# Patient Record
Sex: MALE | Race: WHITE | NOT HISPANIC OR LATINO | Employment: FULL TIME | URBAN - METROPOLITAN AREA
[De-identification: names, ages, dates, MRNs, and addresses within clinical notes are randomized per-mention and may not be internally consistent; named-entity substitution may affect disease eponyms.]

---

## 2019-03-28 ENCOUNTER — OFFICE VISIT (OUTPATIENT)
Dept: MEDICAL GROUP | Facility: PHYSICIAN GROUP | Age: 54
End: 2019-03-28
Payer: COMMERCIAL

## 2019-03-28 VITALS
HEIGHT: 70 IN | WEIGHT: 241 LBS | OXYGEN SATURATION: 97 % | DIASTOLIC BLOOD PRESSURE: 70 MMHG | HEART RATE: 77 BPM | SYSTOLIC BLOOD PRESSURE: 110 MMHG | TEMPERATURE: 98.4 F | BODY MASS INDEX: 34.5 KG/M2

## 2019-03-28 DIAGNOSIS — E66.9 OBESITY (BMI 30.0-34.9): ICD-10-CM

## 2019-03-28 DIAGNOSIS — G47.33 OSA ON CPAP: ICD-10-CM

## 2019-03-28 DIAGNOSIS — Q23.1 BICUSPID AORTIC VALVE: ICD-10-CM

## 2019-03-28 DIAGNOSIS — H61.21 HEARING LOSS OF RIGHT EAR DUE TO CERUMEN IMPACTION: ICD-10-CM

## 2019-03-28 DIAGNOSIS — M1A.09X0 IDIOPATHIC CHRONIC GOUT OF MULTIPLE SITES WITHOUT TOPHUS: ICD-10-CM

## 2019-03-28 DIAGNOSIS — Z23 NEED FOR VACCINATION: ICD-10-CM

## 2019-03-28 PROBLEM — Q23.81 BICUSPID AORTIC VALVE: Status: ACTIVE | Noted: 2019-03-28

## 2019-03-28 PROBLEM — E66.811 OBESITY (BMI 30.0-34.9): Status: ACTIVE | Noted: 2019-03-28

## 2019-03-28 PROBLEM — M10.9 GOUT: Status: ACTIVE | Noted: 2019-03-28

## 2019-03-28 PROCEDURE — 90686 IIV4 VACC NO PRSV 0.5 ML IM: CPT | Performed by: FAMILY MEDICINE

## 2019-03-28 PROCEDURE — 99204 OFFICE O/P NEW MOD 45 MIN: CPT | Mod: 25 | Performed by: FAMILY MEDICINE

## 2019-03-28 PROCEDURE — 90471 IMMUNIZATION ADMIN: CPT | Performed by: FAMILY MEDICINE

## 2019-03-28 PROCEDURE — 69210 REMOVE IMPACTED EAR WAX UNI: CPT | Performed by: FAMILY MEDICINE

## 2019-03-28 RX ORDER — AMLODIPINE BESYLATE 5 MG/1
5 TABLET ORAL DAILY
COMMUNITY
End: 2019-03-28 | Stop reason: SDUPTHER

## 2019-03-28 RX ORDER — AMLODIPINE BESYLATE 5 MG/1
5 TABLET ORAL DAILY
Qty: 90 TAB | Refills: 3 | Status: SHIPPED | OUTPATIENT
Start: 2019-03-28 | End: 2020-03-23

## 2019-03-28 RX ORDER — M-VIT,TX,IRON,MINS/CALC/FOLIC 27MG-0.4MG
1 TABLET ORAL DAILY
COMMUNITY

## 2019-03-28 ASSESSMENT — PATIENT HEALTH QUESTIONNAIRE - PHQ9: CLINICAL INTERPRETATION OF PHQ2 SCORE: 0

## 2019-03-28 ASSESSMENT — PAIN SCALES - GENERAL: PAINLEVEL: NO PAIN

## 2019-03-28 NOTE — LETTER
[facility]  Tae Thomas M.D.  [address]  [facility] [address]  Fax: [phone]   Authorization for Release/Disclosure of   Protected Health Information   Name: YAKELIN HWANG : 1965 SSN: [id number]   Address: [facility] [address] [facility] [address] Phone:    [phone] (home)    I authorize the entity listed below to release/disclose the PHI below to:   [facility]/Tae Thomas M.D. and Tae Thomas M.D.   Provider or Entity Name:     Address   City, State, Zip   Phone:      Fax:     Reason for request: continuity of care   Information to be released:    [  ] LAST COLONOSCOPY,  including any PATH REPORT and follow-up  [  ] LAST FIT/COLOGUARD RESULT [  ] LAST DEXA  [  ] LAST MAMMOGRAM  [  ] LAST PAP  [  ] LAST LABS [  ] RETINA EXAM REPORT  [  ] IMMUNIZATION RECORDS  [  ] Release all info      [  ] Check here and initial the line next to each item to release ALL health information INCLUDING  _____ Care and treatment for drug and / or alcohol abuse  _____ HIV testing, infection status, or AIDS  _____ Genetic Testing    DATES OF SERVICE OR TIME PERIOD TO BE DISCLOSED: _____________  I understand and acknowledge that:  * This Authorization may be revoked at any time by you in writing, except if your health information has already been used or disclosed.  * Your health information that will be used or disclosed as a result of you signing this authorization could be re-disclosed by the recipient. If this occurs, your re-disclosed health information may no longer be protected by State or Federal laws.  * You may refuse to sign this Authorization. Your refusal will not affect your ability to obtain treatment.  * This Authorization becomes effective upon signing and will  on (date) __________.      If no date is indicated, this Authorization will  one (1) year from the signature date.    Name: Yakelin Hwang    Signature:   Date:     3/28/2019       PLEASE FAX REQUESTED RECORDS  BACK TO: [phone]

## 2019-03-29 NOTE — ASSESSMENT & PLAN NOTE
New problem to examiner.  Patient with a history of gout previously controlled on allopurinol however he has been doing well with controlling his diet and no longer taking the allopurinol.  Last gout outbreak has been over a year.  No pain currently.

## 2019-03-29 NOTE — ASSESSMENT & PLAN NOTE
Chronic ongoing medical condition.  Patient actively working on diet, lifestyle, and exercise changes to help lose weight.  States that this has been difficult given sedentary nature family and sedentary nature of his work.

## 2019-03-29 NOTE — ASSESSMENT & PLAN NOTE
New problem to examiner.  Patient with a right-sided cerumen impaction.  Patient has a history of right-sided cerumen impaction and disimpaction in the past.  Hearing is been gradually getting worse over the last several months on the right side.  Denies any tinnitus.

## 2019-03-29 NOTE — ASSESSMENT & PLAN NOTE
New problem to examiner.  Denies any chest pain, shortness of breath, syncope/lightheadedness.  Currently taking amlodipine 5 mg daily prophylactically to prevent elevated blood pressure and preserve existing valve.

## 2019-03-29 NOTE — PROGRESS NOTES
CC:  Diagnoses of Bicuspid aortic valve, Need for vaccination, CLAIRE on CPAP, Idiopathic chronic gout of multiple sites without tophus, Obesity (BMI 30.0-34.9), and Hearing loss of right ear due to cerumen impaction were pertinent to this visit.    HISTORY OF THE PRESENT ILLNESS: Patient is a 53 y.o. male. This pleasant patient is here today to establish new PCP.    Health Maintenance: Completed      Bicuspid aortic valve  New problem to examiner.  Patient with a history of bicuspid aortic valve status post aortic valve replacement.  No problems since surgery.  Denies any chest pain, shortness of breath, syncope/lightheadedness.    Gout  New problem to examiner.  Patient with a history of gout previously controlled on allopurinol however he has been doing well with controlling his diet and no longer taking the allopurinol.  Last gout outbreak has been over a year.  No pain currently.    Hearing loss of right ear due to cerumen impaction  New problem to examiner.  Patient with a right-sided cerumen impaction.  Patient has a history of right-sided cerumen impaction and disimpaction in the past.  Hearing is been gradually getting worse over the last several months on the right side.  Denies any tinnitus.    Obesity (BMI 30.0-34.9)  Chronic ongoing medical condition.  Patient actively working on diet, lifestyle, and exercise changes to help lose weight.  States that this has been difficult given sedentary nature family and sedentary nature of his work.    CLAIRE on CPAP  Chronic ongoing medical condition.  Patient currently using CPAP with 100% compliance at home.  Denies snoring currently with CPAP use.  States that he feels well rested however he has not seen a sleep medicine physician since his arrival to [place] year and a half ago.  States that he is in need of new CPAP supplies.    Allergies: Patient has no known allergies.    Current Outpatient Prescriptions Ordered in [employer]   Medication Sig Dispense Refill   •  "therapeutic multivitamin-minerals (THERAGRAN-M) Tab Take 1 Tab by mouth every day.     • aspirin EC (ECOTRIN) 81 MG Tablet Delayed Response Take 81 mg by mouth every day.     • amLODIPine (NORVASC) 5 MG Tab Take 1 Tab by mouth every day. 90 Tab 3     No current Epic-ordered facility-administered medications on file.        History reviewed. No pertinent past medical history.    Past Surgical History:   Procedure Laterality Date   • APPENDECTOMY     • MYRINGOTOMY     • SHOULDER ARTHROSCOPY Left        Social History   Substance Use Topics   • Smoking status: Never Smoker   • Smokeless tobacco: Never Used   • Alcohol use Yes      Comment: weekly       Social History     Social History Narrative   • No narrative on file       Family History   Problem Relation Age of Onset   • Heart Disease Mother    • Diabetes Mother    • Stroke Mother    • No Known Problems Father    • Cancer Maternal Grandmother         Lung, smoking   • Heart Disease Maternal Grandmother    • Diabetes Maternal Grandmother    • Cancer Maternal Grandfather    • No Known Problems Paternal Grandmother    • Cancer Paternal Grandfather        ROS:   Constitutional: No Fevers, Chills  Eyes: No eye pain  ENT: No sore throat  Resp: No Shortness of breath  CV: No Chest pain  GI: No Nausea/Vomiting  MSK: No weakness  Skin: No rashes  Neuro: No Headaches  Psych: No Suicidal ideations        Exam: Blood pressure 110/70, pulse 77, temperature 36.9 °C (98.4 °F), height 1.778 m (5' 10\"), weight 109.3 kg (241 lb), SpO2 97 %. Body mass index is 34.58 kg/m².    GENERAL: No acute distress  HENT: Atraumatic, normocephalic, external auditory canals clear bilaterally, TMs translucent and pearly gray, nares clear without discharge, posterior pharynx clear without erythema or exudates.  EYES: Extraocular movements intact, pupils equal and reactive to light.  NECK: Supple, FROM  CHEST: No deformities, Equal chest expansion, regular rate and rhythm, 2/6 systolic murmur, no " gallops, or rubs.  RESP: Unlabored, no stridor or audible wheeze.  No wheezes, crackles, nor rhonchi  ABD: Soft, Nontender, Non-Distended.  Normal bowel sounds.  No hepatosplenomegaly  Extremities: No Clubbing, Cyanosis, or Edema.  Skin: Warm/dry, without rashes  Neuro: A/O x 4, CN 2-12 Grossly intact, Motor/sensory grossly intact  Psych: Normal behavior, normal affect    Assessment/Plan:  1. Bicuspid aortic valve  New problem to examiner.  Currently obtaining records from previous provider and from [facility].  Continue amlodipine 5 mg daily.  New prescription sent to [facility] today.    2. Need for vaccination  - Influenza Vaccine Quad Injection >3Y (PF)    3. CLAIRE on CPAP  New problem to examiner.  Referral to sleep medicine as below.  - REFERRAL TO SLEEP STUDIES    4. Idiopathic chronic gout of multiple sites without tophus  New problem to examiner.  Counseled on diet and cherry extract use.  Counseled on follow-up for worsening gout.  Labs as below.  - URIC ACID; Future    5. Obesity (BMI 30.0-34.9)  New problem to examiner.  Labs as below.  Referral to health improvement program as below.  Patient education material provided per  - CBC WITH DIFFERENTIAL; Future  - Comp Metabolic Panel; Future  - Lipid Profile; Future  - REFERRAL TO [facility] HEALTH IMPROVEMENT PROGRAMS (HIP) Services Requested: General-HIP Staff to Evaluate Best Program; Reason for Visit: Overweight/Obesity    6. Hearing loss of right ear due to cerumen impaction  New problem to examiner.  Manual disimpaction with lighted ear curette by MD successful in removing cerumen impaction.    Please note that this dictation was created using voice recognition software. I have made every reasonable attempt to correct obvious errors, but I expect that there are errors of grammar and possibly content that I did not discover before finalizing the note.

## 2019-03-29 NOTE — ASSESSMENT & PLAN NOTE
Chronic ongoing medical condition.  Patient currently using CPAP with 100% compliance at home.  Denies snoring currently with CPAP use.  States that he feels well rested however he has not seen a sleep medicine physician since his arrival to [place] year and a half ago.  States that he is in need of new CPAP supplies.

## 2019-07-11 ENCOUNTER — HOSPITAL ENCOUNTER (OUTPATIENT)
Dept: LAB | Facility: MEDICAL CENTER | Age: 54
End: 2019-07-11
Attending: FAMILY MEDICINE
Payer: COMMERCIAL

## 2019-07-11 DIAGNOSIS — E66.9 OBESITY (BMI 30.0-34.9): ICD-10-CM

## 2019-07-11 DIAGNOSIS — M1A.09X0 IDIOPATHIC CHRONIC GOUT OF MULTIPLE SITES WITHOUT TOPHUS: ICD-10-CM

## 2019-07-11 LAB
ALBUMIN SERPL BCP-MCNC: 4 G/DL (ref 3.2–4.9)
ALBUMIN/GLOB SERPL: 1.2 G/DL
ALP SERPL-CCNC: 50 U/L (ref 30–99)
ALT SERPL-CCNC: 23 U/L (ref 2–50)
ANION GAP SERPL CALC-SCNC: 11 MMOL/L (ref 0–11.9)
AST SERPL-CCNC: 21 U/L (ref 12–45)
BASOPHILS # BLD AUTO: 0.6 % (ref 0–1.8)
BASOPHILS # BLD: 0.06 K/UL (ref 0–0.12)
BILIRUB SERPL-MCNC: 0.7 MG/DL (ref 0.1–1.5)
BUN SERPL-MCNC: 17 MG/DL (ref 8–22)
CALCIUM SERPL-MCNC: 9 MG/DL (ref 8.5–10.5)
CHLORIDE SERPL-SCNC: 101 MMOL/L (ref 96–112)
CHOLEST SERPL-MCNC: 188 MG/DL (ref 100–199)
CO2 SERPL-SCNC: 26 MMOL/L (ref 20–33)
CREAT SERPL-MCNC: 0.86 MG/DL (ref 0.5–1.4)
EOSINOPHIL # BLD AUTO: 0.15 K/UL (ref 0–0.51)
EOSINOPHIL NFR BLD: 1.5 % (ref 0–6.9)
ERYTHROCYTE [DISTWIDTH] IN BLOOD BY AUTOMATED COUNT: 45 FL (ref 35.9–50)
FASTING STATUS PATIENT QL REPORTED: NORMAL
GLOBULIN SER CALC-MCNC: 3.3 G/DL (ref 1.9–3.5)
GLUCOSE SERPL-MCNC: 96 MG/DL (ref 65–99)
HCT VFR BLD AUTO: 43.9 % (ref 42–52)
HDLC SERPL-MCNC: 38 MG/DL
HGB BLD-MCNC: 14.2 G/DL (ref 14–18)
IMM GRANULOCYTES # BLD AUTO: 0.03 K/UL (ref 0–0.11)
IMM GRANULOCYTES NFR BLD AUTO: 0.3 % (ref 0–0.9)
LDLC SERPL CALC-MCNC: 119 MG/DL
LYMPHOCYTES # BLD AUTO: 2.1 K/UL (ref 1–4.8)
LYMPHOCYTES NFR BLD: 21.6 % (ref 22–41)
MCH RBC QN AUTO: 29.8 PG (ref 27–33)
MCHC RBC AUTO-ENTMCNC: 32.3 G/DL (ref 33.7–35.3)
MCV RBC AUTO: 92.2 FL (ref 81.4–97.8)
MONOCYTES # BLD AUTO: 0.66 K/UL (ref 0–0.85)
MONOCYTES NFR BLD AUTO: 6.8 % (ref 0–13.4)
NEUTROPHILS # BLD AUTO: 6.7 K/UL (ref 1.82–7.42)
NEUTROPHILS NFR BLD: 69.2 % (ref 44–72)
NRBC # BLD AUTO: 0 K/UL
NRBC BLD-RTO: 0 /100 WBC
PLATELET # BLD AUTO: 283 K/UL (ref 164–446)
PMV BLD AUTO: 9.4 FL (ref 9–12.9)
POTASSIUM SERPL-SCNC: 3.7 MMOL/L (ref 3.6–5.5)
PROT SERPL-MCNC: 7.3 G/DL (ref 6–8.2)
RBC # BLD AUTO: 4.76 M/UL (ref 4.7–6.1)
SODIUM SERPL-SCNC: 138 MMOL/L (ref 135–145)
TRIGL SERPL-MCNC: 156 MG/DL (ref 0–149)
URATE SERPL-MCNC: 8.6 MG/DL (ref 2.5–8.3)
WBC # BLD AUTO: 9.7 K/UL (ref 4.8–10.8)

## 2019-07-11 PROCEDURE — 80053 COMPREHEN METABOLIC PANEL: CPT

## 2019-07-11 PROCEDURE — 36415 COLL VENOUS BLD VENIPUNCTURE: CPT

## 2019-07-11 PROCEDURE — 85025 COMPLETE CBC W/AUTO DIFF WBC: CPT

## 2019-07-11 PROCEDURE — 84550 ASSAY OF BLOOD/URIC ACID: CPT

## 2019-07-11 PROCEDURE — 80061 LIPID PANEL: CPT

## 2019-07-12 ENCOUNTER — TELEPHONE (OUTPATIENT)
Dept: MEDICAL GROUP | Facility: PHYSICIAN GROUP | Age: 54
End: 2019-07-12

## 2019-07-12 NOTE — TELEPHONE ENCOUNTER
----- Message from Tae Thomas M.D. sent at 7/12/2019  8:46 AM PDT -----  Please advise patient of relatively normal lab results.  His blood chemistry is normal with normal blood sugars and kidney function and liver function.  His uric acid levels are mildly elevated, consistent with his history of gout and I recommend continued dietary adherence to gout friendly diet to prevent further attacks.  His cholesterol is mildly elevated with an elevated triglyceride level.  I do not believe any cholesterol-lowering medication is indicated at this time however I would recommend sticking to a heart healthy diet.  His blood counts and remaining labs are completely normal.

## 2019-10-04 ENCOUNTER — APPOINTMENT (OUTPATIENT)
Dept: MEDICAL GROUP | Facility: PHYSICIAN GROUP | Age: 54
End: 2019-10-04
Payer: COMMERCIAL

## 2020-03-23 RX ORDER — AMLODIPINE BESYLATE 5 MG/1
TABLET ORAL
Qty: 90 TAB | Refills: 0 | Status: SHIPPED | OUTPATIENT
Start: 2020-03-23 | End: 2020-06-22

## 2020-03-23 NOTE — TELEPHONE ENCOUNTER
Requested Prescriptions     Signed Prescriptions Disp Refills   • amLODIPine (NORVASC) 5 MG Tab 90 Tab 0     Sig: TAKE 1 TABLET DAILY     Authorizing Provider: MARGUERITE CABALLERO A.P.R.N.

## 2020-03-23 NOTE — TELEPHONE ENCOUNTER
Received request via: Pharmacy    Was the patient seen in the last year in this department? Yes LAST SEEN 3/28/2019    Does the patient have an active prescription (recently filled or refills available) for medication(s) requested? No

## 2020-07-21 ENCOUNTER — OFFICE VISIT (OUTPATIENT)
Dept: MEDICAL GROUP | Facility: PHYSICIAN GROUP | Age: 55
End: 2020-07-21
Payer: COMMERCIAL

## 2020-07-21 VITALS
TEMPERATURE: 98.6 F | WEIGHT: 234 LBS | RESPIRATION RATE: 14 BRPM | BODY MASS INDEX: 33.5 KG/M2 | OXYGEN SATURATION: 94 % | SYSTOLIC BLOOD PRESSURE: 138 MMHG | DIASTOLIC BLOOD PRESSURE: 62 MMHG | HEIGHT: 70 IN | HEART RATE: 80 BPM

## 2020-07-21 DIAGNOSIS — M1A.09X0 IDIOPATHIC CHRONIC GOUT OF MULTIPLE SITES WITHOUT TOPHUS: ICD-10-CM

## 2020-07-21 DIAGNOSIS — G47.33 OSA ON CPAP: ICD-10-CM

## 2020-07-21 PROCEDURE — 99214 OFFICE O/P EST MOD 30 MIN: CPT | Performed by: FAMILY MEDICINE

## 2020-07-21 RX ORDER — PREDNISONE 50 MG/1
50 TABLET ORAL DAILY
Qty: 5 TAB | Refills: 0 | Status: SHIPPED | OUTPATIENT
Start: 2020-07-21 | End: 2020-08-13 | Stop reason: SDUPTHER

## 2020-07-21 ASSESSMENT — ENCOUNTER SYMPTOMS
CONSTIPATION: 0
VOMITING: 0
ABDOMINAL PAIN: 0
SHORTNESS OF BREATH: 0
MYALGIAS: 0
FLANK PAIN: 0
CHILLS: 0
FEVER: 0
COUGH: 0
DIARRHEA: 0
NAUSEA: 0
FALLS: 0
DIZZINESS: 0

## 2020-07-21 ASSESSMENT — PAIN SCALES - GENERAL: PAINLEVEL: 10=SEVERE PAIN

## 2020-07-21 ASSESSMENT — FIBROSIS 4 INDEX: FIB4 SCORE: 0.88

## 2020-07-21 ASSESSMENT — PATIENT HEALTH QUESTIONNAIRE - PHQ9: CLINICAL INTERPRETATION OF PHQ2 SCORE: 0

## 2020-07-21 NOTE — PROGRESS NOTES
Subjective:      Fuad Hwang is a 55 y.o. male who presents with Leg Pain (Patient thinks its gout. Both legs )            55-year-old male presents with 2 weeks of progressively worsening polyarticular joint pain.  Patient has a history of gout and has been off of prophylactic medication for his gout for years.  Last gout attack is been 8 years ago since he lived in [facility].  Patient states that his left great toe MTP joint began getting hot, red, and swollen after working in the yard and getting relatively dehydrated and drinking copious amounts of water.  Patient states that he has been working on eating well and having a diet that is appropriate for his gout.  1 beer per week.  Patient has been taking Advil which is calm his gout attack however his pain continues and now as of 1 week ago began having right great toe pain at the MTP joint and right knee pain consistent with his previous history of gout.      Review of Systems   Constitutional: Negative for chills and fever.   Respiratory: Negative for cough and shortness of breath.    Cardiovascular: Negative for chest pain.   Gastrointestinal: Negative for abdominal pain, constipation, diarrhea, nausea and vomiting.   Genitourinary: Negative for flank pain.   Musculoskeletal: Positive for joint pain. Negative for falls and myalgias.   Neurological: Negative for dizziness.     I reviewed the following    No past medical history on file.     Past Surgical History:   Procedure Laterality Date   • APPENDECTOMY     • MYRINGOTOMY     • SHOULDER ARTHROSCOPY Left        No Known Allergies    Current Outpatient Medications   Medication Sig Dispense Refill   • predniSONE (DELTASONE) 50 MG Tab Take 1 Tab by mouth every day. 5 Tab 0   • amLODIPine (NORVASC) 5 MG Tab TAKE 1 TABLET DAILY 90 Tab 3   • therapeutic multivitamin-minerals (THERAGRAN-M) Tab Take 1 Tab by mouth every day.     • aspirin EC (ECOTRIN) 81 MG Tablet Delayed Response Take 81 mg by mouth every  day.       No current facility-administered medications for this visit.         Family History   Problem Relation Age of Onset   • Heart Disease Mother    • Diabetes Mother    • Stroke Mother    • No Known Problems Father    • Cancer Maternal Grandmother         Lung, smoking   • Heart Disease Maternal Grandmother    • Diabetes Maternal Grandmother    • Cancer Maternal Grandfather    • No Known Problems Paternal Grandmother    • Cancer Paternal Grandfather        Social History     Socioeconomic History   • Marital status:      Spouse name: Not on file   • Number of children: Not on file   • Years of education: Not on file   • Highest education level: Not on file   Occupational History   • Not on file   Social Needs   • Financial resource strain: Not on file   • Food insecurity     Worry: Not on file     Inability: Not on file   • Transportation needs     Medical: Not on file     Non-medical: Not on file   Tobacco Use   • Smoking status: Never Smoker   • Smokeless tobacco: Never Used   Substance and Sexual Activity   • Alcohol use: Yes     Comment: weekly   • Drug use: No   • Sexual activity: Yes     Partners: Female     Comment: Monogamous , 2boys, manager    Lifestyle   • Physical activity     Days per week: Not on file     Minutes per session: Not on file   • Stress: Not on file   Relationships   • Social connections     Talks on phone: Not on file     Gets together: Not on file     Attends Sikh service: Not on file     Active member of club or organization: Not on file     Attends meetings of clubs or organizations: Not on file     Relationship status: Not on file   • Intimate partner violence     Fear of current or ex partner: Not on file     Emotionally abused: Not on file     Physically abused: Not on file     Forced sexual activity: Not on file   Other Topics Concern   • Not on file   Social History Narrative   • Not on file           Objective:     /62 (BP Location:  "Right arm, Patient Position: Sitting, BP Cuff Size: Adult)   Pulse 80   Temp 37 °C (98.6 °F) (Temporal)   Resp 14   Ht 1.778 m (5' 10\")   Wt 106.1 kg (234 lb)   SpO2 94%   BMI 33.58 kg/m²      Physical Exam  Constitutional:       Appearance: Normal appearance.   HENT:      Head: Atraumatic.      Mouth/Throat:      Mouth: Mucous membranes are moist.      Pharynx: Oropharynx is clear.   Eyes:      Extraocular Movements: Extraocular movements intact.      Pupils: Pupils are equal, round, and reactive to light.   Neck:      Musculoskeletal: Normal range of motion and neck supple.   Cardiovascular:      Pulses: Normal pulses.   Pulmonary:      Effort: Pulmonary effort is normal.      Breath sounds: Normal breath sounds.   Abdominal:      General: Abdomen is flat. There is no distension.   Musculoskeletal: Normal range of motion.        Legs:         Feet:    Skin:     General: Skin is warm and dry.   Neurological:      General: No focal deficit present.      Mental Status: He is alert and oriented to person, place, and time. Mental status is at baseline.      Cranial Nerves: No cranial nerve deficit.   Psychiatric:         Mood and Affect: Mood normal.         Behavior: Behavior normal.                 Assessment/Plan:       1. Idiopathic chronic gout of multiple sites without tophus  New gout attack.  5 days of prednisone as below.  Follow-up if not improving.  Patient elects not to have any allopurinol at this point.  If recurs we will follow-up.  - predniSONE (DELTASONE) 50 MG Tab; Take 1 Tab by mouth every day.  Dispense: 5 Tab; Refill: 0    2. CLAIRE on CPAP  Patient needs new referral to sleep medicine as below.  - REFERRAL TO PULMONARY AND SLEEP MEDICINE Sleep Medicine    Follow-up PRN    Please note that this dictation was created using voice recognition software. I have worked with consultants from the vendor as well as technical experts from [employer] to optimize the interface. I have made every " reasonable attempt to correct obvious errors, but I expect that there are errors of grammar and possibly content that I did not discover before finalizing the note.

## 2020-07-21 NOTE — PATIENT INSTRUCTIONS
Low-Purine Eating Plan  A low-purine eating plan involves making food choices to limit your intake of purine. Purine is a kind of uric acid. Too much uric acid in your blood can cause certain conditions, such as gout and kidney stones. Eating a low-purine diet can help control these conditions.  What are tips for following this plan?  Reading food labels    · Avoid foods with saturated or Trans fat.  · Check the ingredient list of grains-based foods, such as bread and cereal, to make sure that they contain whole grains.  · Check the ingredient list of sauces or soups to make sure they do not contain meat or fish.  · When choosing soft drinks, check the ingredient list to make sure they do not contain high-fructose corn syrup.  Shopping  · Buy plenty of fresh fruits and vegetables.  · Avoid buying canned or fresh fish.  · Buy dairy products labeled as low-fat or nonfat.  · Avoid buying premade or processed foods. These foods are often high in fat, salt (sodium), and added sugar.  Cooking  · Use olive oil instead of butter when cooking. Oils like olive oil, canola oil, and sunflower oil contain healthy fats.  Meal planning  · Learn which foods do or do not affect you. If you find out that a food tends to cause your gout symptoms to flare up, avoid eating that food. You can enjoy foods that do not cause problems. If you have any questions about a food item, talk with your dietitian or health care provider.  · Limit foods high in fat, especially saturated fat. Fat makes it harder for your body to get rid of uric acid.  · Choose foods that are lower in fat and are lean sources of protein.  General guidelines  · Limit alcohol intake to no more than 1 drink a day for nonpregnant women and 2 drinks a day for men. One drink equals 12 oz of beer, 5 oz of wine, or 1½ oz of hard liquor. Alcohol can affect the way your body gets rid of uric acid.  · Drink plenty of water to keep your urine clear or pale yellow. Fluids can help  remove uric acid from your body.  · If directed by your health care provider, take a vitamin C supplement.  · Work with your health care provider and dietitian to develop a plan to achieve or maintain a healthy weight. Losing weight can help reduce uric acid in your blood.  What foods are recommended?  The items listed may not be a complete list. Talk with your dietitian about what dietary choices are best for you.  Foods low in purines  Foods low in purines do not need to be limited. These include:  · All fruits.  · All low-purine vegetables, pickles, and olives.  · Breads, pasta, rice, cornbread, and popcorn. Cake and other baked goods.  · All dairy foods.  · Eggs, nuts, and nut butters.  · Spices and condiments, such as salt, herbs, and vinegar.  · Plant oils, butter, and margarine.  · Water, sugar-free soft drinks, tea, coffee, and cocoa.  · Vegetable-based soups, broths, sauces, and gravies.  Foods moderate in purines  Foods moderate in purines should be limited to the amounts listed.  · ½ cup of asparagus, cauliflower, spinach, mushrooms, or green peas, each day.  · 2/3 cup uncooked oatmeal, each day.  · ¼ cup dry wheat bran or wheat germ, each day.  · 2-3 ounces of meat or poultry, each day.  · 4-6 ounces of shellfish, such as crab, lobster, oysters, or shrimp, each day.  · 1 cup cooked beans, peas, or lentils, each day.  · Soup, broths, or bouillon made from meat or fish. Limit these foods as much as possible.  What foods are not recommended?  The items listed may not be a complete list. Talk with your dietitian about what dietary choices are best for you.  Limit your intake of foods high in purines, including:  · Beer and other alcohol.  · Meat-based gravy or sauce.  · Canned or fresh fish, such as:  ? Anchovies, sardines, herring, and tuna.  ? Mussels and scallops.  ? Codfish, trout, and jayna.  · Barry.  · Organ meats, such as:  ? Liver or kidney.  ? Tripe.  ? Sweetbreads (thymus gland or  pancreas).  · Wild game or goose.  · Yeast or yeast extract supplements.  · Drinks sweetened with high-fructose corn syrup.  Summary  · Eating a low-purine diet can help control conditions caused by too much uric acid in the body, such as gout or kidney stones.  · Choose low-purine foods, limit alcohol, and limit foods high in fat.  · You will learn over time which foods do or do not affect you. If you find out that a food tends to cause your gout symptoms to flare up, avoid eating that food.  This information is not intended to replace advice given to you by your health care provider. Make sure you discuss any questions you have with your health care provider.  Document Released: 04/13/2012 Document Revised: 11/30/2018 Document Reviewed: 01/31/2018  Elsevier Patient Education © 2020 [employer] Inc[employer]

## 2020-08-12 ENCOUNTER — TELEPHONE (OUTPATIENT)
Dept: MEDICAL GROUP | Facility: PHYSICIAN GROUP | Age: 55
End: 2020-08-12

## 2020-08-12 DIAGNOSIS — M1A.09X0 IDIOPATHIC CHRONIC GOUT OF MULTIPLE SITES WITHOUT TOPHUS: ICD-10-CM

## 2020-08-12 NOTE — TELEPHONE ENCOUNTER
1. Caller Name: Fuad Hwang.                        Call Back Number: ph      How would the patient prefer to be contacted with a response: Phone call OK to leave a detailed message    Pt is having another Gout flare up. Last visit you mentioned if he has another flare up you would order Allopurinol as a maintaince medication to take daily Pt also would like another order of Prednisone if possible. Please send Rx to  [facility] pharmacy on [address]

## 2020-08-13 RX ORDER — PREDNISONE 50 MG/1
50 TABLET ORAL DAILY
Qty: 5 TAB | Refills: 0 | Status: SHIPPED | OUTPATIENT
Start: 2020-08-13 | End: 2021-09-08

## 2020-08-13 RX ORDER — ALLOPURINOL 300 MG/1
300 TABLET ORAL DAILY
Qty: 90 TAB | Refills: 4 | Status: SHIPPED | OUTPATIENT
Start: 2020-08-13 | End: 2020-11-02 | Stop reason: SDUPTHER

## 2020-08-13 NOTE — TELEPHONE ENCOUNTER
Please call and notify patient that his prescriptions were sent to his [facility] pharmacy and new laboratory studies have been ordered and need to be completed in 1 month from starting his allopurinol.  Patient may start allopurinol and prednisone at the same time.

## 2020-11-02 DIAGNOSIS — M1A.09X0 IDIOPATHIC CHRONIC GOUT OF MULTIPLE SITES WITHOUT TOPHUS: ICD-10-CM

## 2020-11-03 RX ORDER — ALLOPURINOL 300 MG/1
300 TABLET ORAL DAILY
Qty: 90 TAB | Refills: 4 | Status: SHIPPED | OUTPATIENT
Start: 2020-11-03 | End: 2021-09-08 | Stop reason: SDUPTHER

## 2020-12-18 ENCOUNTER — TELEMEDICINE (OUTPATIENT)
Dept: TELEHEALTH | Facility: TELEMEDICINE | Age: 55
End: 2020-12-18
Payer: COMMERCIAL

## 2020-12-18 DIAGNOSIS — Z20.822 EXPOSURE TO COVID-19 VIRUS: ICD-10-CM

## 2020-12-18 PROCEDURE — 99212 OFFICE O/P EST SF 10 MIN: CPT | Mod: 95,CR,CS | Performed by: NURSE PRACTITIONER

## 2020-12-18 ASSESSMENT — ENCOUNTER SYMPTOMS
NAUSEA: 0
DIZZINESS: 0
SORE THROAT: 0
MYALGIAS: 0
VOMITING: 0
FEVER: 0
CHILLS: 0
EYE REDNESS: 0
SHORTNESS OF BREATH: 0

## 2020-12-19 ENCOUNTER — HOSPITAL ENCOUNTER (OUTPATIENT)
Dept: LAB | Facility: MEDICAL CENTER | Age: 55
End: 2020-12-19
Attending: NURSE PRACTITIONER
Payer: COMMERCIAL

## 2020-12-19 DIAGNOSIS — Z20.822 EXPOSURE TO COVID-19 VIRUS: ICD-10-CM

## 2020-12-19 PROCEDURE — C9803 HOPD COVID-19 SPEC COLLECT: HCPCS

## 2020-12-19 PROCEDURE — U0003 INFECTIOUS AGENT DETECTION BY NUCLEIC ACID (DNA OR RNA); SEVERE ACUTE RESPIRATORY SYNDROME CORONAVIRUS 2 (SARS-COV-2) (CORONAVIRUS DISEASE [COVID-19]), AMPLIFIED PROBE TECHNIQUE, MAKING USE OF HIGH THROUGHPUT TECHNOLOGIES AS DESCRIBED BY CMS-2020-01-R: HCPCS

## 2020-12-19 NOTE — PROGRESS NOTES
Subjective:   Fuad Hwang is a 55 y.o. male who presents for Coronavirus Screening (exposure at work no sx )  This evaluation was conducted via Zoom using secure and encrypted videoconferencing technology. The patient was in a private location in the [facility].    The patient's identity was confirmed and verbal consent was obtained for this virtual visit.    HPI  Patient is a 55-year-old male presents for request of a COVID-19 test.  Patient states that he is asymptomatic however was informed that he was exposed at work from a coworker.  Patient states that at all times he did have a mask on and the interaction was limited in the timeframe.  Patient is concerned as his wife is about to start chemotherapy and is immunocompromised.  Patient has no shortness of breath, fever, chills, no loss of taste or smell.    Review of Systems   Constitutional: Negative for chills and fever.   HENT: Negative for sore throat.    Eyes: Negative for redness.   Respiratory: Negative for shortness of breath.    Cardiovascular: Negative for chest pain.   Gastrointestinal: Negative for nausea and vomiting.   Genitourinary: Negative for dysuria.   Musculoskeletal: Negative for myalgias.   Skin: Negative for rash.   Neurological: Negative for dizziness.       Medications:    • allopurinol Tabs  • amLODIPine Tabs  • aspirin EC Tbec  • predniSONE Tabs  • therapeutic multivitamin-minerals Tabs    Allergies: Patient has no known allergies.    Problem List: Fuad Hwang has Bicuspid aortic valve; CLAIRE on CPAP; Gout; Obesity (BMI 30.0-34.9); and Hearing loss of right ear due to cerumen impaction on their problem list.    Surgical History:  Past Surgical History:   Procedure Laterality Date   • APPENDECTOMY     • MYRINGOTOMY     • SHOULDER ARTHROSCOPY Left        Past Social Hx: Fuad Hwang  reports that he has never smoked. He has never used smokeless tobacco. He reports current alcohol use. He reports that he does not use drugs.      Past Family Hx:  Fuad Hwang family history includes Cancer in his maternal grandfather, maternal grandmother, and paternal grandfather; Diabetes in his maternal grandmother and mother; Heart Disease in his maternal grandmother and mother; No Known Problems in his father and paternal grandmother; Stroke in his mother.     Problem list, medications, and allergies reviewed by myself today in Epic.     Objective:     There were no vitals taken for this visit.    Physical Exam  Constitutional:       General: He is not in acute distress.     Appearance: He is well-developed.   HENT:      Head: Normocephalic and atraumatic.   Eyes:      Conjunctiva/sclera: Conjunctivae normal.      Pupils: Pupils are equal, round, and reactive to light.   Cardiovascular:      Rate and Rhythm: Normal rate and regular rhythm.      Heart sounds: No murmur.   Pulmonary:      Effort: Pulmonary effort is normal. No respiratory distress.      Breath sounds: Normal breath sounds.   Abdominal:      General: There is no distension.      Palpations: Abdomen is soft.      Tenderness: There is no abdominal tenderness.   Skin:     General: Skin is warm and dry.   Neurological:      Mental Status: He is alert and oriented to person, place, and time.      Sensory: No sensory deficit.      Deep Tendon Reflexes: Reflexes are normal and symmetric.         Assessment/Plan:     Diagnosis and associated orders:     1. Exposure to COVID-19 virus  COVID/SARS COV-2 PCR      Comments/MDM:       · Patient will be tested for COVID-19 at this time  · Provided patient with self-isolation instructions  · Provided ER precautions including worsening shortness of breath and high fever  · Stressed the seriousness of isolation and prevention of transmissible disease  · Instructed to take 1000 mg of Tylenol 3 times per day  Differential diagnosis, natural history, supportive care, and indications for immediate follow-up discussed.          Please note that this  dictation was created using voice recognition software. I have made a reasonable attempt to correct obvious errors, but I expect that there are errors of grammar and possibly content that I did not discover before finalizing the note.    This note was electronically signed by Jose Rafael MEJIA.

## 2020-12-20 LAB
COVID ORDER STATUS COVID19: NORMAL
SARS-COV-2 RNA RESP QL NAA+PROBE: NOTDETECTED
SPECIMEN SOURCE: NORMAL

## 2021-03-15 DIAGNOSIS — Z23 NEED FOR VACCINATION: ICD-10-CM

## 2021-04-15 DIAGNOSIS — Q23.1 BICUSPID AORTIC VALVE: ICD-10-CM

## 2021-04-15 RX ORDER — AMLODIPINE BESYLATE 5 MG/1
TABLET ORAL
Qty: 90 TABLET | Refills: 0 | Status: SHIPPED | OUTPATIENT
Start: 2021-04-15 | End: 2021-06-22 | Stop reason: SDUPTHER

## 2021-04-15 NOTE — TELEPHONE ENCOUNTER
Received request via: Pharmacy    Was the patient seen in the last year in this department? Yes on 8/12/2020    Does the patient have an active prescription (recently filled or refills available) for medication(s) requested? No

## 2021-06-22 DIAGNOSIS — Q23.1 BICUSPID AORTIC VALVE: ICD-10-CM

## 2021-06-22 RX ORDER — AMLODIPINE BESYLATE 5 MG/1
TABLET ORAL
Qty: 90 TABLET | Refills: 0 | Status: SHIPPED | OUTPATIENT
Start: 2021-06-22 | End: 2021-07-14 | Stop reason: SDUPTHER

## 2021-06-22 NOTE — TELEPHONE ENCOUNTER
Received request via: Patient    Was the patient seen in the last year in this department? Yes    Does the patient have an active prescription (recently filled or refills available) for medication(s) requested? No     Patient has C and need's refill until he get's establish on September

## 2021-07-14 DIAGNOSIS — Q23.1 BICUSPID AORTIC VALVE: ICD-10-CM

## 2021-07-14 RX ORDER — AMLODIPINE BESYLATE 5 MG/1
TABLET ORAL
Qty: 90 TABLET | Refills: 3 | Status: SHIPPED | OUTPATIENT
Start: 2021-07-14 | End: 2022-07-25 | Stop reason: SDUPTHER

## 2021-07-14 NOTE — TELEPHONE ENCOUNTER
Received request via: Patient No Longer using [facility]    Was the patient seen in the last year in this department? Yes LOV 07/21/20    Does the patient have an active prescription (recently filled or refills available) for medication(s) requested? No

## 2021-09-08 ENCOUNTER — OFFICE VISIT (OUTPATIENT)
Dept: MEDICAL GROUP | Facility: PHYSICIAN GROUP | Age: 56
End: 2021-09-08
Payer: COMMERCIAL

## 2021-09-08 VITALS
BODY MASS INDEX: 31.5 KG/M2 | WEIGHT: 220 LBS | HEART RATE: 65 BPM | TEMPERATURE: 98 F | OXYGEN SATURATION: 95 % | SYSTOLIC BLOOD PRESSURE: 118 MMHG | HEIGHT: 70 IN | DIASTOLIC BLOOD PRESSURE: 62 MMHG

## 2021-09-08 DIAGNOSIS — Z23 NEED FOR VACCINATION: ICD-10-CM

## 2021-09-08 DIAGNOSIS — G47.33 OSA ON CPAP: ICD-10-CM

## 2021-09-08 DIAGNOSIS — Q23.1 BICUSPID AORTIC VALVE: ICD-10-CM

## 2021-09-08 DIAGNOSIS — E66.9 OBESITY (BMI 30.0-34.9): ICD-10-CM

## 2021-09-08 DIAGNOSIS — H61.21 HEARING LOSS OF RIGHT EAR DUE TO CERUMEN IMPACTION: ICD-10-CM

## 2021-09-08 DIAGNOSIS — M1A.09X0 IDIOPATHIC CHRONIC GOUT OF MULTIPLE SITES WITHOUT TOPHUS: ICD-10-CM

## 2021-09-08 DIAGNOSIS — Z11.59 NEED FOR HEPATITIS C SCREENING TEST: ICD-10-CM

## 2021-09-08 PROCEDURE — 99396 PREV VISIT EST AGE 40-64: CPT | Performed by: FAMILY MEDICINE

## 2021-09-08 ASSESSMENT — PATIENT HEALTH QUESTIONNAIRE - PHQ9: CLINICAL INTERPRETATION OF PHQ2 SCORE: 0

## 2021-09-08 NOTE — PROGRESS NOTES
CC:Diagnoses of Need for vaccination, Idiopathic chronic gout of multiple sites without tophus, Need for hepatitis C screening test, Obesity (BMI 30.0-34.9), CLAIRE on CPAP, Bicuspid aortic valve, and Hearing loss of right ear due to cerumen impaction were pertinent to this visit.    Fuad Hwang is a 56 y.o. male presents for a routine preventive health exam.    Health Maintenance: Due for Tdap, Covid vaccination, Shingrix, and flu vaccination.  Up-to-date on colonoscopy and not due till 2023    Screening/Preventative Topics:  Advanced directive: Not on file  Osteoporosis Screen/ DEXA: n/a   Diabetes Screening: Ordered and pending  AAA Screening: n/a  Aspirin Use: Does not use  Diet: Fair  Exercise: Minimal  Screen for depression: PHQ-2: 0   Substance Abuse: None  Safe in relationship   Sun protection used.    Cancer screening  Colorectal Cancer Screening: Up-to-date  Lung Cancer Screening: Not indicated    Infectious disease screening  --STI Screening: Monogamous sexual relationship  --Practices safe sex.  --Hepatitis C Screening: Ordered and pending    Patient Active Problem List    Diagnosis Date Noted   • Bicuspid aortic valve 03/28/2019   • CLAIRE on CPAP 03/28/2019   • Gout 03/28/2019   • Obesity (BMI 30.0-34.9) 03/28/2019   • Hearing loss of right ear due to cerumen impaction 03/28/2019      Allergies:Patient has no known allergies.    Current Outpatient Medications   Medication Sig Dispense Refill   • amLODIPine (NORVASC) 5 MG Tab TAKE 1 TABLET DAILY (Needs an appointment for future refills) 90 tablet 3   • allopurinol (ZYLOPRIM) 300 MG Tab Take 1 Tab by mouth every day. 90 Tab 4   • therapeutic multivitamin-minerals (THERAGRAN-M) Tab Take 1 Tab by mouth every day.     • aspirin EC (ECOTRIN) 81 MG Tablet Delayed Response Take 81 mg by mouth every day.     • predniSONE (DELTASONE) 50 MG Tab Take 1 Tab by mouth every day. (Patient not taking: Reported on 9/8/2021) 5 Tab 0     No current facility-administered  "medications for this visit.       Social History     Tobacco Use   • Smoking status: Never Smoker   • Smokeless tobacco: Never Used   Substance Use Topics   • Alcohol use: Yes     Comment: weekly   • Drug use: No     Social History     Social History Narrative   • Not on file       Family History   Problem Relation Age of Onset   • Heart Disease Mother    • Diabetes Mother    • Stroke Mother    • No Known Problems Father    • Cancer Maternal Grandmother         Lung, smoking   • Heart Disease Maternal Grandmother    • Diabetes Maternal Grandmother    • Cancer Maternal Grandfather    • No Known Problems Paternal Grandmother    • Cancer Paternal Grandfather        Review of Systems:    Constitutional: No Fevers, Chills  Eyes: No vision changes  ENT: No hearing changes  Resp: No Shortness of breath  CV: No Chest pain  GI: No Nausea/Vomiting  MSK: No weakness  Skin: No rashes  Neuro: No Headaches  Psych: No Suicidal ideations    All remaining systems reviewed and found to be negative, except as stated above.    Exam:    /62 (BP Location: Right arm, Patient Position: Sitting, BP Cuff Size: Adult)   Pulse 65   Temp 36.7 °C (98 °F) (Temporal)   Ht 1.778 m (5' 10\")   Wt 99.8 kg (220 lb)   SpO2 95%  Body mass index is 31.57 kg/m².    General:  Well nourished, well developed male in NAD  HENT: Atraumatic, normocephalic, bilaterally impacted TMs with cerumen  EYES: Extraocular movements intact, pupils equal and reactive to light  NECK: Supple, FROM  CHEST: No deformities, Equal chest expansion  RESP: Unlabored, no stridor or audible wheeze  ABD: Soft, Nontender, Non-Distended  Extremities: No Clubbing, Cyanosis, or Edema  Skin: Warm/dry, without rashes  Neuro: A/O x 4, CN 2-12 Grossly intact, Motor/sensory grossly intact  Psych: Normal behavior, normal affect    LABS: 7/11/2019: Results reviewed and discussed with the patient, questions answered.      Assessment/Plan:  1. Need for vaccination  Due for Covid, " Shingrix, Tdap, flu vaccination.  Not currently available at this clinic site at this time    2. Idiopathic chronic gout of multiple sites without tophus  Continue allopurinol.  Uric acid level as below.  - URIC ACID; Future    3. Need for hepatitis C screening test  - HEP C VIRUS ANTIBODY; Future    4. Obesity (BMI 30.0-34.9)  Actively losing weight.  BMI 31.57 today, labs as below.  - CBC WITH DIFFERENTIAL; Future  - Comp Metabolic Panel; Future  - Lipid Profile; Future    5. CLAIRE on CPAP  Continue CPAP use    6. Bicuspid aortic valve  Ultrasound as below to monitor for bicuspid aortic valve stenosis.  - EC-ECHOCARDIOGRAM COMPLETE W/O CONT; Future      Patient up-to-date on preventative health maintenance examinations.  Age-appropriate anticipatory guidance provided today.    Preventive visit in 1 year, sooner as needed for any concerns.     Please note that this dictation was created using voice recognition software. I have worked with consultants from the vendor as well as technical experts from [email or web address] [employer] to optimize the interface. I have made every reasonable attempt to correct obvious errors, but I expect that there are errors of grammar and possibly content that I did not discover before finalizing the note.

## 2021-09-10 ENCOUNTER — HOSPITAL ENCOUNTER (OUTPATIENT)
Dept: LAB | Facility: MEDICAL CENTER | Age: 56
End: 2021-09-10
Attending: FAMILY MEDICINE
Payer: COMMERCIAL

## 2021-09-10 DIAGNOSIS — Z11.59 NEED FOR HEPATITIS C SCREENING TEST: ICD-10-CM

## 2021-09-10 DIAGNOSIS — M1A.09X0 IDIOPATHIC CHRONIC GOUT OF MULTIPLE SITES WITHOUT TOPHUS: ICD-10-CM

## 2021-09-10 DIAGNOSIS — E66.9 OBESITY (BMI 30.0-34.9): ICD-10-CM

## 2021-09-10 LAB
ALBUMIN SERPL BCP-MCNC: 4.2 G/DL (ref 3.2–4.9)
ALBUMIN/GLOB SERPL: 1.3 G/DL
ALP SERPL-CCNC: 62 U/L (ref 30–99)
ALT SERPL-CCNC: 17 U/L (ref 2–50)
ANION GAP SERPL CALC-SCNC: 11 MMOL/L (ref 7–16)
AST SERPL-CCNC: 19 U/L (ref 12–45)
BASOPHILS # BLD AUTO: 0.6 % (ref 0–1.8)
BASOPHILS # BLD: 0.05 K/UL (ref 0–0.12)
BILIRUB SERPL-MCNC: 1 MG/DL (ref 0.1–1.5)
BUN SERPL-MCNC: 20 MG/DL (ref 8–22)
CALCIUM SERPL-MCNC: 9.9 MG/DL (ref 8.5–10.5)
CHLORIDE SERPL-SCNC: 100 MMOL/L (ref 96–112)
CHOLEST SERPL-MCNC: 187 MG/DL (ref 100–199)
CO2 SERPL-SCNC: 28 MMOL/L (ref 20–33)
CREAT SERPL-MCNC: 0.82 MG/DL (ref 0.5–1.4)
EOSINOPHIL # BLD AUTO: 0.31 K/UL (ref 0–0.51)
EOSINOPHIL NFR BLD: 4 % (ref 0–6.9)
ERYTHROCYTE [DISTWIDTH] IN BLOOD BY AUTOMATED COUNT: 49.8 FL (ref 35.9–50)
FASTING STATUS PATIENT QL REPORTED: NORMAL
GLOBULIN SER CALC-MCNC: 3.3 G/DL (ref 1.9–3.5)
GLUCOSE SERPL-MCNC: 98 MG/DL (ref 65–99)
HCT VFR BLD AUTO: 47.2 % (ref 42–52)
HCV AB SER QL: NORMAL
HDLC SERPL-MCNC: 42 MG/DL
HGB BLD-MCNC: 15.3 G/DL (ref 14–18)
IMM GRANULOCYTES # BLD AUTO: 0.03 K/UL (ref 0–0.11)
IMM GRANULOCYTES NFR BLD AUTO: 0.4 % (ref 0–0.9)
LDLC SERPL CALC-MCNC: 124 MG/DL
LYMPHOCYTES # BLD AUTO: 1.52 K/UL (ref 1–4.8)
LYMPHOCYTES NFR BLD: 19.4 % (ref 22–41)
MCH RBC QN AUTO: 31.2 PG (ref 27–33)
MCHC RBC AUTO-ENTMCNC: 32.4 G/DL (ref 33.7–35.3)
MCV RBC AUTO: 96.3 FL (ref 81.4–97.8)
MONOCYTES # BLD AUTO: 0.63 K/UL (ref 0–0.85)
MONOCYTES NFR BLD AUTO: 8 % (ref 0–13.4)
NEUTROPHILS # BLD AUTO: 5.3 K/UL (ref 1.82–7.42)
NEUTROPHILS NFR BLD: 67.6 % (ref 44–72)
NRBC # BLD AUTO: 0 K/UL
NRBC BLD-RTO: 0 /100 WBC
PLATELET # BLD AUTO: 288 K/UL (ref 164–446)
PMV BLD AUTO: 9.8 FL (ref 9–12.9)
POTASSIUM SERPL-SCNC: 4.5 MMOL/L (ref 3.6–5.5)
PROT SERPL-MCNC: 7.5 G/DL (ref 6–8.2)
RBC # BLD AUTO: 4.9 M/UL (ref 4.7–6.1)
SODIUM SERPL-SCNC: 139 MMOL/L (ref 135–145)
TRIGL SERPL-MCNC: 103 MG/DL (ref 0–149)
URATE SERPL-MCNC: 8.2 MG/DL (ref 2.5–8.3)
WBC # BLD AUTO: 7.8 K/UL (ref 4.8–10.8)

## 2021-09-10 PROCEDURE — 80061 LIPID PANEL: CPT

## 2021-09-10 PROCEDURE — 86803 HEPATITIS C AB TEST: CPT

## 2021-09-10 PROCEDURE — 80053 COMPREHEN METABOLIC PANEL: CPT

## 2021-09-10 PROCEDURE — 36415 COLL VENOUS BLD VENIPUNCTURE: CPT

## 2021-09-10 PROCEDURE — 85025 COMPLETE CBC W/AUTO DIFF WBC: CPT

## 2021-09-10 PROCEDURE — 84550 ASSAY OF BLOOD/URIC ACID: CPT

## 2021-09-16 ENCOUNTER — HOSPITAL ENCOUNTER (OUTPATIENT)
Dept: CARDIOLOGY | Facility: MEDICAL CENTER | Age: 56
End: 2021-09-16
Attending: FAMILY MEDICINE
Payer: COMMERCIAL

## 2021-09-16 DIAGNOSIS — Q23.1 BICUSPID AORTIC VALVE: ICD-10-CM

## 2021-09-16 LAB
LV EJECT FRACT  99904: 65
LV EJECT FRACT MOD 2C 99903: 65.32
LV EJECT FRACT MOD 4C 99902: 61.41
LV EJECT FRACT MOD BP 99901: 63.95

## 2021-09-16 PROCEDURE — 93306 TTE W/DOPPLER COMPLETE: CPT

## 2021-09-16 PROCEDURE — 93306 TTE W/DOPPLER COMPLETE: CPT | Mod: 26 | Performed by: INTERNAL MEDICINE

## 2021-09-23 ENCOUNTER — NON-PROVIDER VISIT (OUTPATIENT)
Dept: MEDICAL GROUP | Facility: PHYSICIAN GROUP | Age: 56
End: 2021-09-23
Payer: COMMERCIAL

## 2021-09-23 DIAGNOSIS — Z23 NEED FOR VACCINATION: ICD-10-CM

## 2021-09-23 PROCEDURE — 90472 IMMUNIZATION ADMIN EACH ADD: CPT | Performed by: NURSE PRACTITIONER

## 2021-09-23 PROCEDURE — 90750 HZV VACC RECOMBINANT IM: CPT | Performed by: NURSE PRACTITIONER

## 2021-09-23 PROCEDURE — 90471 IMMUNIZATION ADMIN: CPT | Performed by: NURSE PRACTITIONER

## 2021-09-23 PROCEDURE — 90715 TDAP VACCINE 7 YRS/> IM: CPT | Performed by: NURSE PRACTITIONER

## 2021-09-23 NOTE — PROGRESS NOTES
"Fuad Hwang is a 56 y.o. male here for a non-provider visit for:   TDAP  SHINGRIX (Shingles)    Reason for immunization: continue or complete series started at the office  Immunization records indicate need for vaccine: Yes, confirmed with Epic  Minimum interval has been met for this vaccine: Yes  ABN completed: Not Indicated    VIS Dated  8/6/21 was given to patient: Yes  All IAC Questionnaire questions were answered \"No.\"    Patient tolerated injection and no adverse effects were observed or reported: Yes    Pt scheduled for next dose in series: No  "

## 2022-01-18 ENCOUNTER — OFFICE VISIT (OUTPATIENT)
Dept: URGENT CARE | Facility: PHYSICIAN GROUP | Age: 57
End: 2022-01-18
Payer: COMMERCIAL

## 2022-01-18 ENCOUNTER — HOSPITAL ENCOUNTER (OUTPATIENT)
Facility: MEDICAL CENTER | Age: 57
End: 2022-01-18
Attending: STUDENT IN AN ORGANIZED HEALTH CARE EDUCATION/TRAINING PROGRAM
Payer: COMMERCIAL

## 2022-01-18 VITALS
WEIGHT: 215 LBS | BODY MASS INDEX: 30.78 KG/M2 | HEART RATE: 100 BPM | HEIGHT: 70 IN | RESPIRATION RATE: 18 BRPM | SYSTOLIC BLOOD PRESSURE: 128 MMHG | OXYGEN SATURATION: 93 % | TEMPERATURE: 98.2 F | DIASTOLIC BLOOD PRESSURE: 74 MMHG

## 2022-01-18 DIAGNOSIS — R53.81 MALAISE AND FATIGUE: ICD-10-CM

## 2022-01-18 DIAGNOSIS — R53.83 MALAISE AND FATIGUE: ICD-10-CM

## 2022-01-18 PROCEDURE — U0005 INFEC AGEN DETEC AMPLI PROBE: HCPCS

## 2022-01-18 PROCEDURE — U0003 INFECTIOUS AGENT DETECTION BY NUCLEIC ACID (DNA OR RNA); SEVERE ACUTE RESPIRATORY SYNDROME CORONAVIRUS 2 (SARS-COV-2) (CORONAVIRUS DISEASE [COVID-19]), AMPLIFIED PROBE TECHNIQUE, MAKING USE OF HIGH THROUGHPUT TECHNOLOGIES AS DESCRIBED BY CMS-2020-01-R: HCPCS

## 2022-01-18 PROCEDURE — 99213 OFFICE O/P EST LOW 20 MIN: CPT | Performed by: STUDENT IN AN ORGANIZED HEALTH CARE EDUCATION/TRAINING PROGRAM

## 2022-01-18 RX ORDER — ACETAMINOPHEN 500 MG
500-1000 TABLET ORAL EVERY 6 HOURS PRN
COMMUNITY

## 2022-01-18 ASSESSMENT — ENCOUNTER SYMPTOMS
FEVER: 1
COUGH: 1
CHILLS: 1
MYALGIAS: 1

## 2022-01-18 ASSESSMENT — FIBROSIS 4 INDEX: FIB4 SCORE: 0.93

## 2022-01-18 NOTE — PROGRESS NOTES
"Subjective     Fuad Hwang is a 56 y.o. male who presents with Coronavirus Screening (started this morning with headaches and sweats)            56-year-old male nonvaccinated for COVID-19 with malaise subjective fevers rigors and chills.  Was to be tested for COVID-19.      Review of Systems   Constitutional: Positive for chills, fever and malaise/fatigue.   HENT: Positive for congestion.    Respiratory: Positive for cough.    Musculoskeletal: Positive for myalgias.   All other systems reviewed and are negative.             Objective     /74 (BP Location: Left arm, Patient Position: Sitting, BP Cuff Size: Adult long)   Pulse 100   Temp 36.8 °C (98.2 °F) (Temporal)   Resp 18   Ht 1.778 m (5' 10\")   Wt 97.5 kg (215 lb)   SpO2 93%   BMI 30.85 kg/m²      Physical Exam  Constitutional:       Appearance: He is ill-appearing.   HENT:      Nose: Congestion and rhinorrhea present.                             Assessment & Plan        1. Malaise and fatigue  56-year-old male with subjective fevers rigors chills in addition to generalized malaise and fatigue.  Was to be tested for COVID-19     Plan:  1.  COVID-19 PCR testing    Patient was counseled that her results will be available in 24 to 48 hours via his mobile device on his [employer].  Patient endorsed understanding.  - SARS-CoV-2 PCR (24 hour In-House): Collect NP swab in VTM; Future                "

## 2022-01-19 DIAGNOSIS — R53.83 MALAISE AND FATIGUE: ICD-10-CM

## 2022-01-19 DIAGNOSIS — R53.81 MALAISE AND FATIGUE: ICD-10-CM

## 2022-04-20 ENCOUNTER — APPOINTMENT (OUTPATIENT)
Dept: MEDICAL GROUP | Facility: PHYSICIAN GROUP | Age: 57
End: 2022-04-20
Payer: COMMERCIAL

## 2022-07-25 ENCOUNTER — OFFICE VISIT (OUTPATIENT)
Dept: MEDICAL GROUP | Facility: PHYSICIAN GROUP | Age: 57
End: 2022-07-25
Payer: COMMERCIAL

## 2022-07-25 VITALS
OXYGEN SATURATION: 98 % | BODY MASS INDEX: 29.78 KG/M2 | HEART RATE: 52 BPM | TEMPERATURE: 97.9 F | HEIGHT: 70 IN | RESPIRATION RATE: 16 BRPM | WEIGHT: 208 LBS | SYSTOLIC BLOOD PRESSURE: 116 MMHG | DIASTOLIC BLOOD PRESSURE: 64 MMHG

## 2022-07-25 DIAGNOSIS — I10 ESSENTIAL HYPERTENSION: ICD-10-CM

## 2022-07-25 DIAGNOSIS — Z12.11 COLON CANCER SCREENING: ICD-10-CM

## 2022-07-25 DIAGNOSIS — Z76.89 ENCOUNTER TO ESTABLISH CARE WITH NEW DOCTOR: ICD-10-CM

## 2022-07-25 DIAGNOSIS — M1A.09X0 IDIOPATHIC CHRONIC GOUT OF MULTIPLE SITES WITHOUT TOPHUS: ICD-10-CM

## 2022-07-25 DIAGNOSIS — Z00.00 WELL ADULT EXAM: ICD-10-CM

## 2022-07-25 PROBLEM — M10.9 GOUT: Chronic | Status: ACTIVE | Noted: 2019-03-28

## 2022-07-25 PROCEDURE — 99214 OFFICE O/P EST MOD 30 MIN: CPT | Performed by: INTERNAL MEDICINE

## 2022-07-25 RX ORDER — ALLOPURINOL 300 MG/1
300 TABLET ORAL DAILY
Qty: 90 TABLET | Refills: 4 | Status: SHIPPED | OUTPATIENT
Start: 2022-07-25 | End: 2022-10-03 | Stop reason: SDUPTHER

## 2022-07-25 RX ORDER — AMLODIPINE BESYLATE 5 MG/1
TABLET ORAL
Qty: 90 TABLET | Refills: 3 | Status: SHIPPED | OUTPATIENT
Start: 2022-07-25 | End: 2022-10-18 | Stop reason: SDUPTHER

## 2022-07-25 ASSESSMENT — FIBROSIS 4 INDEX: FIB4 SCORE: 0.94

## 2022-07-25 ASSESSMENT — PATIENT HEALTH QUESTIONNAIRE - PHQ9: CLINICAL INTERPRETATION OF PHQ2 SCORE: 0

## 2022-07-25 NOTE — ASSESSMENT & PLAN NOTE
Chronic condition.  The patient is taking amlodipine daily.  He is requesting prescription refills.  No significant side effects reported.

## 2022-07-25 NOTE — PROGRESS NOTES
"PRIMARY CARE CLINIC VISIT  Chief Complaint   Patient presents with   • New Patient     Establish care  Prescription refills    History of Present Illness     Encounter to establish care with new doctor  Patient presents to establish care with new primary care provider.    Gout  Chronic condition.  The patient is taking allopurinol.  Patient stated that he has not had gout flareup for over 1 year.    Essential hypertension  Chronic condition.  The patient is taking amlodipine daily.  He is requesting prescription refills.  No significant side effects reported.      Current Outpatient Medications on File Prior to Visit   Medication Sig Dispense Refill   • acetaminophen (TYLENOL) 500 MG Tab Take 500-1,000 mg by mouth every 6 hours as needed.     • therapeutic multivitamin-minerals (THERAGRAN-M) Tab Take 1 Tab by mouth every day.     • aspirin EC (ECOTRIN) 81 MG Tablet Delayed Response Take 81 mg by mouth every day.       No current facility-administered medications on file prior to visit.        Allergies: Patient has no known allergies.    ROS  As per HPI above. All other systems reviewed and negative.      Past Medical, Social, and Family history reviewed and updated in EPIC     Objective     /64 (BP Location: Left arm, Patient Position: Sitting, BP Cuff Size: Adult)   Pulse (!) 52   Temp 36.6 °C (97.9 °F) (Temporal)   Resp 16   Ht 1.778 m (5' 10\")   Wt 94.3 kg (208 lb)   SpO2 98%    Body mass index is 29.84 kg/m².    General: alert in no apparent distress.  Cardiovascular: regular rate and rhythm  Pulmonary: lungs : no wheezing   Gastrointestinal: BS present. No obvious mass noted          Assessment and Plan     1. Encounter to establish care with new doctor    2. Idiopathic chronic gout of multiple sites without tophus  Chronic stable condition.  Continue with allopurinol.  Blood test requested.  - allopurinol (ZYLOPRIM) 300 MG Tab; Take 1 Tablet by mouth every day.  Dispense: 90 Tablet; Refill: 4  - " URIC ACID; Future    3. Essential hypertension  Chronic condition.  Stable.  Continue with amlodipine.  BP today 116/64.    4. Well adult exam  Routine lab work requested.  Advised the patient to follow-up few days after blood test done.  - Lipid Profile; Future  - PROSTATE SPECIFIC AG SCREENING; Future  - Basic Metabolic Panel; Future  - CBC WITHOUT DIFFERENTIAL; Future  - HEMOGLOBIN A1C; Future  - MICROALBUMIN CREAT RATIO URINE; Future    5. Colon cancer screening  - Referral to  for Colonoscopy                      Healthcare Maintenance     Recommend the patient to go to local pharmacy to get shingle vaccine.        Please note that this dictation was created using voice recognition software. I have made every reasonable attempt to correct obvious errors, but I expect that there are errors of grammar and possibly content that I did not discover before finalizing the note.    Aleksandr Parrish MD  Internal Medicine  [facility]

## 2022-07-25 NOTE — ASSESSMENT & PLAN NOTE
Chronic condition.  The patient is taking allopurinol.  Patient stated that he has not had gout flareup for over 1 year.

## 2022-08-11 ENCOUNTER — HOSPITAL ENCOUNTER (OUTPATIENT)
Dept: LAB | Facility: MEDICAL CENTER | Age: 57
End: 2022-08-11
Attending: INTERNAL MEDICINE
Payer: COMMERCIAL

## 2022-08-11 DIAGNOSIS — Z00.00 WELL ADULT EXAM: ICD-10-CM

## 2022-08-11 DIAGNOSIS — M1A.09X0 IDIOPATHIC CHRONIC GOUT OF MULTIPLE SITES WITHOUT TOPHUS: ICD-10-CM

## 2022-08-11 DIAGNOSIS — R97.20 PSA ELEVATION: ICD-10-CM

## 2022-08-11 PROBLEM — R73.03 PREDIABETES: Status: ACTIVE | Noted: 2022-08-11

## 2022-08-11 LAB
ANION GAP SERPL CALC-SCNC: 8 MMOL/L (ref 7–16)
BUN SERPL-MCNC: 15 MG/DL (ref 8–22)
CALCIUM SERPL-MCNC: 9.6 MG/DL (ref 8.5–10.5)
CHLORIDE SERPL-SCNC: 104 MMOL/L (ref 96–112)
CHOLEST SERPL-MCNC: 146 MG/DL (ref 100–199)
CO2 SERPL-SCNC: 28 MMOL/L (ref 20–33)
CREAT SERPL-MCNC: 0.83 MG/DL (ref 0.5–1.4)
CREAT UR-MCNC: 217.28 MG/DL
ERYTHROCYTE [DISTWIDTH] IN BLOOD BY AUTOMATED COUNT: 50.3 FL (ref 35.9–50)
EST. AVERAGE GLUCOSE BLD GHB EST-MCNC: 120 MG/DL
FASTING STATUS PATIENT QL REPORTED: NORMAL
GFR SERPLBLD CREATININE-BSD FMLA CKD-EPI: 101 ML/MIN/1.73 M 2
GLUCOSE SERPL-MCNC: 108 MG/DL (ref 65–99)
HBA1C MFR BLD: 5.8 % (ref 4–5.6)
HCT VFR BLD AUTO: 42.8 % (ref 42–52)
HDLC SERPL-MCNC: 38 MG/DL
HGB BLD-MCNC: 14.2 G/DL (ref 14–18)
LDLC SERPL CALC-MCNC: 92 MG/DL
MCH RBC QN AUTO: 31.5 PG (ref 27–33)
MCHC RBC AUTO-ENTMCNC: 33.2 G/DL (ref 33.7–35.3)
MCV RBC AUTO: 94.9 FL (ref 81.4–97.8)
MICROALBUMIN UR-MCNC: <1.2 MG/DL
MICROALBUMIN/CREAT UR: NORMAL MG/G (ref 0–30)
PLATELET # BLD AUTO: 271 K/UL (ref 164–446)
PMV BLD AUTO: 9.7 FL (ref 9–12.9)
POTASSIUM SERPL-SCNC: 4.1 MMOL/L (ref 3.6–5.5)
PSA SERPL-MCNC: 11.3 NG/ML (ref 0–4)
RBC # BLD AUTO: 4.51 M/UL (ref 4.7–6.1)
SODIUM SERPL-SCNC: 140 MMOL/L (ref 135–145)
TRIGL SERPL-MCNC: 81 MG/DL (ref 0–149)
URATE SERPL-MCNC: 4.7 MG/DL (ref 2.5–8.3)
WBC # BLD AUTO: 6.4 K/UL (ref 4.8–10.8)

## 2022-08-11 PROCEDURE — 83036 HEMOGLOBIN GLYCOSYLATED A1C: CPT

## 2022-08-11 PROCEDURE — 80061 LIPID PANEL: CPT

## 2022-08-11 PROCEDURE — 85027 COMPLETE CBC AUTOMATED: CPT

## 2022-08-11 PROCEDURE — 36415 COLL VENOUS BLD VENIPUNCTURE: CPT

## 2022-08-11 PROCEDURE — 82043 UR ALBUMIN QUANTITATIVE: CPT

## 2022-08-11 PROCEDURE — 84153 ASSAY OF PSA TOTAL: CPT

## 2022-08-11 PROCEDURE — 80048 BASIC METABOLIC PNL TOTAL CA: CPT

## 2022-08-11 PROCEDURE — 82570 ASSAY OF URINE CREATININE: CPT

## 2022-08-11 PROCEDURE — 84550 ASSAY OF BLOOD/URIC ACID: CPT

## 2022-10-03 DIAGNOSIS — Z00.00 WELL ADULT EXAM: ICD-10-CM

## 2022-10-03 DIAGNOSIS — M1A.09X0 IDIOPATHIC CHRONIC GOUT OF MULTIPLE SITES WITHOUT TOPHUS: ICD-10-CM

## 2022-10-04 RX ORDER — ALLOPURINOL 300 MG/1
300 TABLET ORAL DAILY
Qty: 90 TABLET | Refills: 4 | Status: SHIPPED | OUTPATIENT
Start: 2022-10-04 | End: 2023-10-04

## 2022-10-08 ENCOUNTER — HOSPITAL ENCOUNTER (OUTPATIENT)
Dept: LAB | Facility: MEDICAL CENTER | Age: 57
End: 2022-10-08
Attending: UROLOGY
Payer: COMMERCIAL

## 2022-10-08 LAB — PSA SERPL-MCNC: 12.9 NG/ML (ref 0–4)

## 2022-10-08 PROCEDURE — 36415 COLL VENOUS BLD VENIPUNCTURE: CPT

## 2022-10-08 PROCEDURE — 84153 ASSAY OF PSA TOTAL: CPT

## 2022-10-18 DIAGNOSIS — Z00.00 WELL ADULT EXAM: ICD-10-CM

## 2022-10-19 RX ORDER — AMLODIPINE BESYLATE 5 MG/1
TABLET ORAL
Qty: 90 TABLET | Refills: 3 | Status: SHIPPED | OUTPATIENT
Start: 2022-10-19 | End: 2023-10-11

## 2022-11-11 ENCOUNTER — HOSPITAL ENCOUNTER (OUTPATIENT)
Dept: LAB | Facility: MEDICAL CENTER | Age: 57
End: 2022-11-11
Attending: UROLOGY
Payer: COMMERCIAL

## 2022-11-11 LAB
BUN SERPL-MCNC: 14 MG/DL (ref 8–22)
CREAT SERPL-MCNC: 0.69 MG/DL (ref 0.5–1.4)
GFR SERPLBLD CREATININE-BSD FMLA CKD-EPI: 106 ML/MIN/1.73 M 2
PSA SERPL-MCNC: 11.6 NG/ML (ref 0–4)

## 2022-11-11 PROCEDURE — 84520 ASSAY OF UREA NITROGEN: CPT

## 2022-11-11 PROCEDURE — 36415 COLL VENOUS BLD VENIPUNCTURE: CPT

## 2022-11-11 PROCEDURE — 84153 ASSAY OF PSA TOTAL: CPT

## 2022-11-11 PROCEDURE — 82565 ASSAY OF CREATININE: CPT

## 2022-12-03 ENCOUNTER — APPOINTMENT (OUTPATIENT)
Dept: RADIOLOGY | Facility: MEDICAL CENTER | Age: 57
End: 2022-12-03
Attending: UROLOGY
Payer: COMMERCIAL

## 2022-12-15 PROBLEM — C61 PROSTATE CANCER (HCC): Status: ACTIVE | Noted: 2022-12-15

## 2023-01-23 ENCOUNTER — OFFICE VISIT (OUTPATIENT)
Dept: MEDICAL GROUP | Facility: PHYSICIAN GROUP | Age: 58
End: 2023-01-23
Payer: COMMERCIAL

## 2023-01-23 VITALS
BODY MASS INDEX: 23.22 KG/M2 | TEMPERATURE: 98.3 F | HEART RATE: 50 BPM | RESPIRATION RATE: 16 BRPM | WEIGHT: 162.2 LBS | OXYGEN SATURATION: 97 % | SYSTOLIC BLOOD PRESSURE: 102 MMHG | DIASTOLIC BLOOD PRESSURE: 60 MMHG | HEIGHT: 70 IN

## 2023-01-23 DIAGNOSIS — I10 ESSENTIAL HYPERTENSION: Chronic | ICD-10-CM

## 2023-01-23 DIAGNOSIS — R73.03 PREDIABETES: ICD-10-CM

## 2023-01-23 DIAGNOSIS — M1A.09X0 IDIOPATHIC CHRONIC GOUT OF MULTIPLE SITES WITHOUT TOPHUS: Chronic | ICD-10-CM

## 2023-01-23 DIAGNOSIS — G47.33 OSA ON CPAP: Chronic | ICD-10-CM

## 2023-01-23 DIAGNOSIS — C61 PROSTATE CANCER (HCC): ICD-10-CM

## 2023-01-23 PROBLEM — R97.20 PSA ELEVATION: Status: RESOLVED | Noted: 2022-08-11 | Resolved: 2023-01-23

## 2023-01-23 PROCEDURE — 99214 OFFICE O/P EST MOD 30 MIN: CPT | Performed by: INTERNAL MEDICINE

## 2023-01-23 ASSESSMENT — FIBROSIS 4 INDEX: FIB4 SCORE: 1

## 2023-01-23 ASSESSMENT — PATIENT HEALTH QUESTIONNAIRE - PHQ9: CLINICAL INTERPRETATION OF PHQ2 SCORE: 0

## 2023-01-23 NOTE — PROGRESS NOTES
PRIMARY CARE CLINIC VISIT    Chief complaint:    Prostate cancer  Follow-up hypertension and gout  Follow-up lab test  Prediabetes        History of Present Illness     Prostate cancer (HCC)  This is a new condition.  The patient was noted with elevated PSA.  Patient was seen by urologist and was confirmed to have prostate cancer.  The patient is scheduled to undergo  prostatectomy February 16, 2023.  This is to be done at [facility].  No new issue reported.  The patient denies fever chills dysuria or hematuria.    Essential hypertension  Chronic condition.  The patient is currently taking amlodipine 5 mg daily.  Blood pressure has been well controlled.  No significant side effects reported.  Blood pressure today 102/60.    Gout  This is a chronic condition.  The patient is being treated with allopurinol.  Patient denies recent gout flareup.  No significant side effects reported.    CLAIRE on CPAP  This is a chronic ongoing medical condition.  The patient is presently using CPAP.  No issue or problem reported.  Advised the patient to continue follow-up with sleep medicine specialist as directed.    Prediabetes  This is a new condition noted with recent lab test.  Result discussed with the patient.  Patient not interested in taking a medication at this time.  Recommend to continue to monitor    Current Outpatient Medications on File Prior to Visit   Medication Sig Dispense Refill    amLODIPine (NORVASC) 5 MG Tab TAKE 1 TABLET DAILY (Needs an appointment for future refills) 90 Tablet 3    allopurinol (ZYLOPRIM) 300 MG Tab Take 1 Tablet by mouth every day. 90 Tablet 4    therapeutic multivitamin-minerals (THERAGRAN-M) Tab Take 1 Tab by mouth every day.      aspirin EC (ECOTRIN) 81 MG Tablet Delayed Response Take 81 mg by mouth every day.      acetaminophen (TYLENOL) 500 MG Tab Take 500-1,000 mg by mouth every 6 hours as needed.       No current facility-administered medications on file prior to visit.     "    Allergies: Patient has no known allergies.    ROS  As per HPI above. All other systems reviewed and negative.      Past Medical, Social, and Family history reviewed and updated in EPIC     Objective     /60 (BP Location: Left arm, Patient Position: Sitting, BP Cuff Size: Adult)   Pulse (!) 50   Temp 36.8 °C (98.3 °F) (Temporal)   Resp 16   Ht 1.778 m (5' 10\")   Wt 73.6 kg (162 lb 3.2 oz)   SpO2 97%    Body mass index is 23.27 kg/m².    General: alert in no apparent distress.  Cardiovascular: regular rate and rhythm  Pulmonary: lungs : no wheezing   Gastrointestinal: BS present. No obvious mass noted        Lab Results   Component Value Date/Time    HBA1C 5.8 (H) 08/11/2022 06:55 AM       Lab Results   Component Value Date/Time    WBC 6.4 08/11/2022 06:55 AM    HEMOGLOBIN 14.2 08/11/2022 06:55 AM    HEMATOCRIT 42.8 08/11/2022 06:55 AM    MCV 94.9 08/11/2022 06:55 AM    PLATELETCT 271 08/11/2022 06:55 AM         Lab Results   Component Value Date/Time    SODIUM 140 08/11/2022 06:55 AM    POTASSIUM 4.1 08/11/2022 06:55 AM    GLUCOSE 108 (H) 08/11/2022 06:55 AM    BUN 14 11/11/2022 07:54 AM    CREATININE 0.69 11/11/2022 07:54 AM       Lab Results   Component Value Date/Time    CHOLSTRLTOT 146 08/11/2022 06:55 AM    LDL 92 08/11/2022 06:55 AM    HDL 38 (A) 08/11/2022 06:55 AM    TRIGLYCERIDE 81 08/11/2022 06:55 AM       Lab Results   Component Value Date/Time    ALTSGPT 17 09/10/2021 06:14 AM             Assessment and Plan     1. Prostate cancer (HCC)  This is a new condition.  Patient presently followed by urologist.  As above the patient is scheduled to undergo prostatectomy February 16, 2023  Recommend follow-up after surgery done.      2. Idiopathic chronic gout of multiple sites without tophus  Chronic stable condition.  Continue with allopurinol 300 mg p.o. daily.  Presently the patient asymptomatic.  No significant side effects reported.    3. Essential hypertension  Chronic stable condition.  " Recommend to continue amlodipine 5 mg daily.  Recommend sodium restriction.  Recommend to continue to monitor blood pressure on regular basis      4. Prediabetes  This is a new condition.  Lab test result discussed with the patient.  Advised the patient regarding diet and exercise.  Recommend follow-up with lab test in approximately 6 months    - HEMOGLOBIN A1C; Future  - Basic Metabolic Panel; Future  - Lipid Profile; Future    5. CLAIRE on CPAP  Chronic stable condition.  Continue CPAP.  Continue follow-up with sleep medicine specialist      Total time:   31 min -  That includes time for chart review before the visit, the actual patient visit, and time spent on documentation in EMR after the visit.  Chart review/prep, review of other providers' records, imaging/lab review, face-to-face time for history/examination, ordering, prescribing,  review of results/meds/ treatment plan with patient, and care coordination.                     Please note that this dictation was created using voice recognition software. I have made every reasonable attempt to correct obvious errors, but I expect that there are errors of grammar and possibly content that I did not discover before finalizing the note.    Aleksandr Parrish MD  Internal Medicine  [facility]

## 2023-01-23 NOTE — ASSESSMENT & PLAN NOTE
This is a chronic ongoing medical condition.  The patient is presently using CPAP.  No issue or problem reported.  Advised the patient to continue follow-up with sleep medicine specialist as directed.

## 2023-01-23 NOTE — ASSESSMENT & PLAN NOTE
This is a chronic condition.  The patient is being treated with allopurinol.  Patient denies recent gout flareup.  No significant side effects reported.

## 2023-01-23 NOTE — ASSESSMENT & PLAN NOTE
This is a new condition noted with recent lab test.  Result discussed with the patient.  Patient not interested in taking a medication at this time.  Recommend to continue to monitor

## 2023-01-23 NOTE — ASSESSMENT & PLAN NOTE
Chronic condition.  The patient is currently taking amlodipine 5 mg daily.  Blood pressure has been well controlled.  No significant side effects reported.  Blood pressure today 102/60.

## 2023-01-23 NOTE — ASSESSMENT & PLAN NOTE
This is a new condition.  The patient was noted with elevated PSA.  Patient was seen by urologist and was confirmed to have prostate cancer.  The patient is scheduled to undergo radical prostatectomy February 16, 2023.  This is to be done at [facility].  No new issue reported.  The patient denies fever chills dysuria or hematuria.

## 2023-03-23 ENCOUNTER — HOSPITAL ENCOUNTER (OUTPATIENT)
Dept: LAB | Facility: MEDICAL CENTER | Age: 58
End: 2023-03-23
Attending: UROLOGY
Payer: COMMERCIAL

## 2023-03-23 LAB — PSA SERPL-MCNC: 0.04 NG/ML (ref 0–4)

## 2023-03-23 PROCEDURE — 36415 COLL VENOUS BLD VENIPUNCTURE: CPT

## 2023-03-23 PROCEDURE — 84153 ASSAY OF PSA TOTAL: CPT

## 2023-03-31 ENCOUNTER — HOSPITAL ENCOUNTER (OUTPATIENT)
Facility: MEDICAL CENTER | Age: 58
End: 2023-03-31
Attending: UROLOGY
Payer: COMMERCIAL

## 2023-03-31 PROCEDURE — 87086 URINE CULTURE/COLONY COUNT: CPT

## 2023-04-03 LAB
BACTERIA UR CULT: NORMAL
SIGNIFICANT IND 70042: NORMAL
SITE SITE: NORMAL
SOURCE SOURCE: NORMAL

## 2023-06-24 ENCOUNTER — HOSPITAL ENCOUNTER (OUTPATIENT)
Dept: LAB | Facility: MEDICAL CENTER | Age: 58
End: 2023-06-24
Attending: UROLOGY
Payer: COMMERCIAL

## 2023-06-24 ENCOUNTER — HOSPITAL ENCOUNTER (OUTPATIENT)
Dept: LAB | Facility: MEDICAL CENTER | Age: 58
End: 2023-06-24
Attending: INTERNAL MEDICINE
Payer: COMMERCIAL

## 2023-06-24 DIAGNOSIS — R73.03 PREDIABETES: ICD-10-CM

## 2023-06-24 LAB
ANION GAP SERPL CALC-SCNC: 12 MMOL/L (ref 7–16)
BUN SERPL-MCNC: 22 MG/DL (ref 8–22)
CALCIUM SERPL-MCNC: 9.8 MG/DL (ref 8.5–10.5)
CHLORIDE SERPL-SCNC: 102 MMOL/L (ref 96–112)
CHOLEST SERPL-MCNC: 196 MG/DL (ref 100–199)
CO2 SERPL-SCNC: 28 MMOL/L (ref 20–33)
CREAT SERPL-MCNC: 0.7 MG/DL (ref 0.5–1.4)
EST. AVERAGE GLUCOSE BLD GHB EST-MCNC: 114 MG/DL
FASTING STATUS PATIENT QL REPORTED: NORMAL
GFR SERPLBLD CREATININE-BSD FMLA CKD-EPI: 105 ML/MIN/1.73 M 2
GLUCOSE SERPL-MCNC: 99 MG/DL (ref 65–99)
HBA1C MFR BLD: 5.6 % (ref 4–5.6)
HDLC SERPL-MCNC: 57 MG/DL
LDLC SERPL CALC-MCNC: 127 MG/DL
POTASSIUM SERPL-SCNC: 4.5 MMOL/L (ref 3.6–5.5)
PSA SERPL-MCNC: <0.02 NG/ML (ref 0–4)
SODIUM SERPL-SCNC: 142 MMOL/L (ref 135–145)
TRIGL SERPL-MCNC: 58 MG/DL (ref 0–149)

## 2023-06-24 PROCEDURE — 83036 HEMOGLOBIN GLYCOSYLATED A1C: CPT

## 2023-06-24 PROCEDURE — 84153 ASSAY OF PSA TOTAL: CPT

## 2023-06-24 PROCEDURE — 80048 BASIC METABOLIC PNL TOTAL CA: CPT

## 2023-06-24 PROCEDURE — 80061 LIPID PANEL: CPT

## 2023-06-24 PROCEDURE — 36415 COLL VENOUS BLD VENIPUNCTURE: CPT

## 2023-07-31 ENCOUNTER — OFFICE VISIT (OUTPATIENT)
Dept: MEDICAL GROUP | Facility: PHYSICIAN GROUP | Age: 58
End: 2023-07-31
Payer: COMMERCIAL

## 2023-07-31 VITALS
HEIGHT: 70 IN | WEIGHT: 180.13 LBS | DIASTOLIC BLOOD PRESSURE: 60 MMHG | OXYGEN SATURATION: 98 % | RESPIRATION RATE: 20 BRPM | SYSTOLIC BLOOD PRESSURE: 110 MMHG | BODY MASS INDEX: 25.79 KG/M2 | HEART RATE: 54 BPM | TEMPERATURE: 98.1 F

## 2023-07-31 DIAGNOSIS — E55.9 VITAMIN D DEFICIENCY: ICD-10-CM

## 2023-07-31 DIAGNOSIS — M1A.09X0 IDIOPATHIC CHRONIC GOUT OF MULTIPLE SITES WITHOUT TOPHUS: Chronic | ICD-10-CM

## 2023-07-31 DIAGNOSIS — Z12.11 SCREENING FOR COLORECTAL CANCER: ICD-10-CM

## 2023-07-31 DIAGNOSIS — R73.03 PREDIABETES: Chronic | ICD-10-CM

## 2023-07-31 DIAGNOSIS — I10 ESSENTIAL HYPERTENSION: Chronic | ICD-10-CM

## 2023-07-31 DIAGNOSIS — C61 PROSTATE CANCER (HCC): ICD-10-CM

## 2023-07-31 DIAGNOSIS — Z12.12 SCREENING FOR COLORECTAL CANCER: ICD-10-CM

## 2023-07-31 DIAGNOSIS — G47.33 OSA ON CPAP: Chronic | ICD-10-CM

## 2023-07-31 DIAGNOSIS — N52.8 OTHER MALE ERECTILE DYSFUNCTION: ICD-10-CM

## 2023-07-31 DIAGNOSIS — E78.5 DYSLIPIDEMIA: ICD-10-CM

## 2023-07-31 PROCEDURE — 3078F DIAST BP <80 MM HG: CPT | Performed by: INTERNAL MEDICINE

## 2023-07-31 PROCEDURE — 3074F SYST BP LT 130 MM HG: CPT | Performed by: INTERNAL MEDICINE

## 2023-07-31 PROCEDURE — 99214 OFFICE O/P EST MOD 30 MIN: CPT | Performed by: INTERNAL MEDICINE

## 2023-07-31 RX ORDER — TADALAFIL 5 MG/1
5 TABLET ORAL
COMMUNITY
Start: 2023-06-30 | End: 2023-07-31

## 2023-07-31 RX ORDER — TADALAFIL 5 MG/1
TABLET ORAL
COMMUNITY
Start: 2023-05-02

## 2023-07-31 RX ORDER — ALLOPURINOL 300 MG/1
1 TABLET ORAL
COMMUNITY
End: 2023-07-31

## 2023-07-31 RX ORDER — OXYCODONE HYDROCHLORIDE 5 MG/1
TABLET ORAL
COMMUNITY
End: 2023-07-31

## 2023-07-31 RX ORDER — TADALAFIL 5 MG/1
1 TABLET ORAL
COMMUNITY
End: 2023-07-31

## 2023-07-31 ASSESSMENT — FIBROSIS 4 INDEX: FIB4 SCORE: 1.02

## 2023-07-31 NOTE — ASSESSMENT & PLAN NOTE
This is a chronic condition.  The patient is taking Norvasc 5 mg daily.  Patient tolerating medication well.  No significant side effects reported.

## 2023-07-31 NOTE — ASSESSMENT & PLAN NOTE
Chronic condition.  Patient presently taking Cialis 5 mg daily.  Patient tolerating medication well.

## 2023-07-31 NOTE — ASSESSMENT & PLAN NOTE
Chronic stable condition.  The patient denies recent gout flareup.  He is presently taking allopurinol 300 mg daily.  Patient tolerating medication well.

## 2023-07-31 NOTE — ASSESSMENT & PLAN NOTE
Patient status post prostatectomy February 2023.  Patient was seen by urology service recently.  PSA level was below 0.02.  Patient denies fever chills dysuria or hematuria.

## 2023-07-31 NOTE — ASSESSMENT & PLAN NOTE
This is a chronic condition.  The patient currently on diet therapy.  Recent lipid panel result discussed with the patient

## 2023-07-31 NOTE — ASSESSMENT & PLAN NOTE
This is a chronic condition.  The patient reported that he has not used CPAP machine for several years.  Patient asymptomatic.  Recommend referral to sleep medicine specialist for reevaluation.

## 2023-07-31 NOTE — PROGRESS NOTES
PRIMARY CARE CLINIC VISIT    Chief complaint:    Follow-up hypertension  Prostate cancer  Erectile dysfunction  Gout  Prediabetes  Hyperlipidemia  Referral to GI for colonoscopy      History of Present Illness     Prostate cancer (HCC)  Patient status post prostatectomy February 2023.  Patient was seen by urology service recently.  PSA level was below 0.02.  Patient denies fever chills dysuria or hematuria.    Other male erectile dysfunction  Chronic condition.  Patient presently taking Cialis 5 mg daily.  Patient tolerating medication well.    CLAIRE on CPAP  This is a chronic condition.  The patient reported that he has not used CPAP machine for several years.  Patient asymptomatic.  Recommend referral to sleep medicine specialist for reevaluation.    Gout  Chronic stable condition.  The patient denies recent gout flareup.  He is presently taking allopurinol 300 mg daily.  Patient tolerating medication well.    Essential hypertension  This is a chronic condition.  The patient is taking Norvasc 5 mg daily.  Patient tolerating medication well.  No significant side effects reported.    Prediabetes  Chronic condition.  The patient currently on diet therapy.  Lab test requested for follow-up.    Dyslipidemia  This is a chronic condition.  The patient currently on diet therapy.  Recent lipid panel result discussed with the patient    Current Outpatient Medications on File Prior to Visit   Medication Sig Dispense Refill    tadalafil (CIALIS) 5 MG tablet tadalafil 5 mg tablet   TAKE 1 TABLET BY MOUTH EVERY DAY FOR 30 DAYS      amLODIPine (NORVASC) 5 MG Tab TAKE 1 TABLET DAILY (Needs an appointment for future refills) 90 Tablet 3    allopurinol (ZYLOPRIM) 300 MG Tab Take 1 Tablet by mouth every day. 90 Tablet 4    therapeutic multivitamin-minerals (THERAGRAN-M) Tab Take 1 Tab by mouth every day.      acetaminophen (TYLENOL) 500 MG Tab Take 500-1,000 mg by mouth every 6 hours as needed.      aspirin EC (ECOTRIN) 81 MG Tablet  Delayed Response Take 81 mg by mouth every day.       No current facility-administered medications on file prior to visit.        Allergies: Patient has no known allergies.    Current Outpatient Medications Ordered in Epic   Medication Sig Dispense Refill    tadalafil (CIALIS) 5 MG tablet tadalafil 5 mg tablet   TAKE 1 TABLET BY MOUTH EVERY DAY FOR 30 DAYS      amLODIPine (NORVASC) 5 MG Tab TAKE 1 TABLET DAILY (Needs an appointment for future refills) 90 Tablet 3    allopurinol (ZYLOPRIM) 300 MG Tab Take 1 Tablet by mouth every day. 90 Tablet 4    therapeutic multivitamin-minerals (THERAGRAN-M) Tab Take 1 Tab by mouth every day.      acetaminophen (TYLENOL) 500 MG Tab Take 500-1,000 mg by mouth every 6 hours as needed.      aspirin EC (ECOTRIN) 81 MG Tablet Delayed Response Take 81 mg by mouth every day.       No current [facility]-ordered facility-administered medications on file.       History reviewed. No pertinent past medical history.    Past Surgical History:   Procedure Laterality Date    APPENDECTOMY      MYRINGOTOMY      SHOULDER ARTHROSCOPY Left        Family History   Problem Relation Age of Onset    Heart Disease Mother     Diabetes Mother     Stroke Mother     No Known Problems Father     Cancer Maternal Grandmother         Lung, smoking    Heart Disease Maternal Grandmother     Diabetes Maternal Grandmother     Cancer Maternal Grandfather     No Known Problems Paternal Grandmother     Cancer Paternal Grandfather        Social History     Tobacco Use   Smoking Status Never   Smokeless Tobacco Never       Social History     Substance and Sexual Activity   Alcohol Use Yes    Comment: weekly       Review of systems.  As per HPI above. All other systems reviewed and negative.      Past Medical, Social, and Family history reviewed and updated in EPIC     Objective     /60 (BP Location: Left arm, Patient Position: Sitting, BP Cuff Size: Adult)   Pulse (!) 54   Temp 36.7 °C (98.1 °F) (Temporal)   Resp 20    "Ht 1.778 m (5' 10\")   Wt 81.7 kg (180 lb 2 oz)   SpO2 98%    Body mass index is 25.85 kg/m².    General: alert in no apparent distress.  Cardiovascular: regular rate and rhythm  Pulmonary: lungs : no wheezing   Gastrointestinal: BS present. No obvious mass noted        Lab Results   Component Value Date/Time    HBA1C 5.6 06/24/2023 07:49 AM    HBA1C 5.8 (H) 08/11/2022 06:55 AM       Lab Results   Component Value Date/Time    WBC 6.4 08/11/2022 06:55 AM    HEMOGLOBIN 14.2 08/11/2022 06:55 AM    HEMATOCRIT 42.8 08/11/2022 06:55 AM    MCV 94.9 08/11/2022 06:55 AM    PLATELETCT 271 08/11/2022 06:55 AM         Lab Results   Component Value Date/Time    SODIUM 142 06/24/2023 07:49 AM    POTASSIUM 4.5 06/24/2023 07:49 AM    GLUCOSE 99 06/24/2023 07:49 AM    BUN 22 06/24/2023 07:49 AM    CREATININE 0.70 06/24/2023 07:49 AM       Lab Results   Component Value Date/Time    CHOLSTRLTOT 196 06/24/2023 07:49 AM    TRIGLYCERIDE 58 06/24/2023 07:49 AM    HDL 57 06/24/2023 07:49 AM     (H) 06/24/2023 07:49 AM       Lab Results   Component Value Date/Time    ALTSGPT 17 09/10/2021 06:14 AM             Assessment and Plan     1. Prostate cancer (HCC)  Chronic condition.  The patient status post prostatectomy February 2023.  Recent PSA result discussed with the patient.  Patient advised to continue follow-up with urology service.  His next appointment is October 2023.    2. Idiopathic chronic gout of multiple sites without tophus  Chronic stable condition.  Continue allopurinol 200 mg daily.    3. Essential hypertension  Chronic stable condition per continue Norvasc 5 mg daily.    4. Prediabetes  - HEMOGLOBIN A1C; Future  - Basic Metabolic Panel; Future  Chronic condition.  Recommend diet and exercise.  Continue to monitor.    5. Dyslipidemia  - ALANINE AMINO-TRANS; Future  - Lipid Profile; Future  Chronic stable condition.  Recent lipid panel result discussed with the patient.  Continue with diet and exercise.  Continue to " monitor.    6. CLAIRE on CPAP  Chronic condition.  Current status unclear.  The patient has not been using CPAP machine for several years.  - Referral to Sleep Medicine    7. Vitamin D deficiency  - VITAMIN D,25 HYDROXY (DEFICIENCY); Future    8. Other male erectile dysfunction  Chronic stable condition.  Continue with Cialis 5 mg daily.  Continue follow-up with urology service.    9. Screening for colorectal cancer  - Referral to GI for Colonoscopy    Other orders  - tadalafil (CIALIS) 5 MG tablet; tadalafil 5 mg tablet   TAKE 1 TABLET BY MOUTH EVERY DAY FOR 30 DAYS                      Healthcare Maintenance       Health Maintenance Due   Topic Date Due    COLORECTAL CANCER SCREENING  Never done               Please note that this dictation was created using voice recognition software. I have made every reasonable attempt to correct obvious errors, but I expect that there are errors of grammar and possibly content that I did not discover before finalizing the note.    Aleksandr Parrish MD  Internal Medicine  [facility] care [facility]

## 2023-08-15 ENCOUNTER — TELEPHONE (OUTPATIENT)
Dept: HEALTH INFORMATION MANAGEMENT | Facility: OTHER | Age: 58
End: 2023-08-15

## 2023-10-02 DIAGNOSIS — Z00.00 WELL ADULT EXAM: ICD-10-CM

## 2023-10-02 DIAGNOSIS — M1A.09X0 IDIOPATHIC CHRONIC GOUT OF MULTIPLE SITES WITHOUT TOPHUS: ICD-10-CM

## 2023-10-04 RX ORDER — ALLOPURINOL 300 MG/1
300 TABLET ORAL DAILY
Qty: 90 TABLET | Refills: 3 | Status: SHIPPED | OUTPATIENT
Start: 2023-10-04

## 2023-10-11 DIAGNOSIS — Z00.00 WELL ADULT EXAM: ICD-10-CM

## 2023-10-11 RX ORDER — AMLODIPINE BESYLATE 5 MG/1
TABLET ORAL
Qty: 90 TABLET | Refills: 3 | Status: SHIPPED | OUTPATIENT
Start: 2023-10-11

## 2023-10-17 ENCOUNTER — HOSPITAL ENCOUNTER (OUTPATIENT)
Dept: LAB | Facility: MEDICAL CENTER | Age: 58
End: 2023-10-17
Attending: PHYSICIAN ASSISTANT
Payer: COMMERCIAL

## 2023-10-17 LAB — PSA SERPL-MCNC: <0.02 NG/ML (ref 0–4)

## 2023-10-17 PROCEDURE — 36415 COLL VENOUS BLD VENIPUNCTURE: CPT

## 2023-10-17 PROCEDURE — 84153 ASSAY OF PSA TOTAL: CPT

## 2024-01-29 ENCOUNTER — HOSPITAL ENCOUNTER (OUTPATIENT)
Dept: LAB | Facility: MEDICAL CENTER | Age: 59
End: 2024-01-29
Attending: UROLOGY
Payer: COMMERCIAL

## 2024-01-29 ENCOUNTER — OFFICE VISIT (OUTPATIENT)
Dept: MEDICAL GROUP | Facility: PHYSICIAN GROUP | Age: 59
End: 2024-01-29
Payer: COMMERCIAL

## 2024-01-29 VITALS
HEIGHT: 70 IN | WEIGHT: 183 LBS | BODY MASS INDEX: 26.2 KG/M2 | DIASTOLIC BLOOD PRESSURE: 68 MMHG | OXYGEN SATURATION: 98 % | HEART RATE: 51 BPM | SYSTOLIC BLOOD PRESSURE: 116 MMHG | TEMPERATURE: 97.6 F

## 2024-01-29 DIAGNOSIS — K63.5 POLYP OF COLON, UNSPECIFIED PART OF COLON, UNSPECIFIED TYPE: ICD-10-CM

## 2024-01-29 DIAGNOSIS — M1A.09X0 IDIOPATHIC CHRONIC GOUT OF MULTIPLE SITES WITHOUT TOPHUS: Chronic | ICD-10-CM

## 2024-01-29 DIAGNOSIS — E78.5 DYSLIPIDEMIA: Chronic | ICD-10-CM

## 2024-01-29 DIAGNOSIS — G47.33 OSA ON CPAP: Chronic | ICD-10-CM

## 2024-01-29 DIAGNOSIS — Z23 NEED FOR VACCINATION: ICD-10-CM

## 2024-01-29 DIAGNOSIS — I10 ESSENTIAL HYPERTENSION: Chronic | ICD-10-CM

## 2024-01-29 DIAGNOSIS — N52.8 OTHER MALE ERECTILE DYSFUNCTION: ICD-10-CM

## 2024-01-29 DIAGNOSIS — C61 PROSTATE CANCER (HCC): ICD-10-CM

## 2024-01-29 DIAGNOSIS — R73.03 PREDIABETES: Chronic | ICD-10-CM

## 2024-01-29 LAB — PSA SERPL-MCNC: <0.02 NG/ML (ref 0–4)

## 2024-01-29 PROCEDURE — 36415 COLL VENOUS BLD VENIPUNCTURE: CPT

## 2024-01-29 PROCEDURE — 90471 IMMUNIZATION ADMIN: CPT | Performed by: INTERNAL MEDICINE

## 2024-01-29 PROCEDURE — 84153 ASSAY OF PSA TOTAL: CPT

## 2024-01-29 PROCEDURE — 3078F DIAST BP <80 MM HG: CPT | Performed by: INTERNAL MEDICINE

## 2024-01-29 PROCEDURE — 3074F SYST BP LT 130 MM HG: CPT | Performed by: INTERNAL MEDICINE

## 2024-01-29 PROCEDURE — 99214 OFFICE O/P EST MOD 30 MIN: CPT | Mod: 25 | Performed by: INTERNAL MEDICINE

## 2024-01-29 PROCEDURE — 90686 IIV4 VACC NO PRSV 0.5 ML IM: CPT | Performed by: INTERNAL MEDICINE

## 2024-01-29 ASSESSMENT — PATIENT HEALTH QUESTIONNAIRE - PHQ9: CLINICAL INTERPRETATION OF PHQ2 SCORE: 0

## 2024-01-29 NOTE — ASSESSMENT & PLAN NOTE
Chronic condition.  The patient is taking allopurinol 300 mg daily.  Patient denied recent gout flare.

## 2024-01-29 NOTE — ASSESSMENT & PLAN NOTE
Chronic condition.  The patient is taking amlodipine 5 mg daily.  Blood pressure has been well-controlled.  No significant side effects reported.

## 2024-01-29 NOTE — ASSESSMENT & PLAN NOTE
Chronic condition.  The patient is taking Cialis 5 mg daily as needed.  No new symptoms reported.  Patient tolerating medication well

## 2024-01-29 NOTE — ASSESSMENT & PLAN NOTE
This is a new condition noted with recent colonoscopy.  GI specialist recommend to repeat colonoscopy in 2026.  No new symptoms reported.

## 2024-01-29 NOTE — ASSESSMENT & PLAN NOTE
Chronic condition.  Patient is status post prostatectomy 2023.  Patient followed by urologist regular basis.  He has pending appointment to repeat PSA lab test and will follow-up with urologist after that.  No new symptoms reported.

## 2024-01-29 NOTE — PROGRESS NOTES
PRIMARY CARE CLINIC VISIT        Chief Complaint   Patient presents with    Follow-Up     Follow-up hypertension  Prostate cancer  Hyperlipidemia  Gout  Prediabetes  Colon polyp  Erectile dysfunction          History of Present Illness     Prostate cancer (HCC)  Chronic condition.  Patient is status post prostatectomy 2023.  Patient followed by urologist regular basis.  He has pending appointment to repeat PSA lab test and will follow-up with urologist after that.  No new symptoms reported.    Dyslipidemia  Chronic condition.  Patient currently on diet therapy.  Patient is due for lab test.    Essential hypertension  Chronic condition.  The patient is taking amlodipine 5 mg daily.  Blood pressure has been well-controlled.  No significant side effects reported.    Gout  Chronic condition.  The patient is taking allopurinol 300 mg daily.  Patient denied recent gout flare.    Prediabetes  Chronic condition.  The patient currently on diet therapy.  Lab test ordered for follow-up.    Colon polyp  This is a new condition noted with recent colonoscopy.  GI specialist recommend to repeat colonoscopy in 2026.  No new symptoms reported.    Other male erectile dysfunction  Chronic condition.  The patient is taking Cialis 5 mg daily as needed.  No new symptoms reported.  Patient tolerating medication well    Current Outpatient Medications on File Prior to Visit   Medication Sig Dispense Refill    amLODIPine (NORVASC) 5 MG Tab TAKE 1 TABLET BY MOUTH EVERY DAY. *APPOINTMENT NEEDED* 90 Tablet 3    allopurinol (ZYLOPRIM) 300 MG Tab TAKE 1 TABLET BY MOUTH EVERY DAY 90 Tablet 3    tadalafil (CIALIS) 5 MG tablet tadalafil 5 mg tablet   TAKE 1 TABLET BY MOUTH EVERY DAY FOR 30 DAYS      therapeutic multivitamin-minerals (THERAGRAN-M) Tab Take 1 Tab by mouth every day.      aspirin EC (ECOTRIN) 81 MG Tablet Delayed Response Take 81 mg by mouth every day.      acetaminophen (TYLENOL) 500 MG Tab Take 500-1,000 mg by mouth every 6 hours as  "needed.       No current facility-administered medications on file prior to visit.        Allergies: Patient has no known allergies.    Current Outpatient Medications Ordered in Epic   Medication Sig Dispense Refill    amLODIPine (NORVASC) 5 MG Tab TAKE 1 TABLET BY MOUTH EVERY DAY. *APPOINTMENT NEEDED* 90 Tablet 3    allopurinol (ZYLOPRIM) 300 MG Tab TAKE 1 TABLET BY MOUTH EVERY DAY 90 Tablet 3    tadalafil (CIALIS) 5 MG tablet tadalafil 5 mg tablet   TAKE 1 TABLET BY MOUTH EVERY DAY FOR 30 DAYS      therapeutic multivitamin-minerals (THERAGRAN-M) Tab Take 1 Tab by mouth every day.      aspirin EC (ECOTRIN) 81 MG Tablet Delayed Response Take 81 mg by mouth every day.      acetaminophen (TYLENOL) 500 MG Tab Take 500-1,000 mg by mouth every 6 hours as needed.       No current Epic-ordered facility-administered medications on file.       History reviewed. No pertinent past medical history.    Past Surgical History:   Procedure Laterality Date    APPENDECTOMY      MYRINGOTOMY      SHOULDER ARTHROSCOPY Left        Family History   Problem Relation Age of Onset    Heart Disease Mother     Diabetes Mother     Stroke Mother     No Known Problems Father     Cancer Maternal Grandmother         Lung, smoking    Heart Disease Maternal Grandmother     Diabetes Maternal Grandmother     Cancer Maternal Grandfather     No Known Problems Paternal Grandmother     Cancer Paternal Grandfather        Social History     Tobacco Use   Smoking Status Never   Smokeless Tobacco Never       Social History     Substance and Sexual Activity   Alcohol Use Yes    Comment: weekly       Review of systems.  As per HPI above. All other systems reviewed and negative.      Past Medical, Social, and Family history reviewed and updated in EPIC     Objective     /68 (BP Location: Right arm, Patient Position: Sitting, BP Cuff Size: Adult)   Pulse (!) 51   Temp 36.4 °C (97.6 °F) (Temporal)   Ht 1.778 m (5' 10\")   Wt 83 kg (183 lb)   SpO2 98%  "   Body mass index is 26.26 kg/m².    General: alert in no apparent distress.  Cardiovascular: regular rate and rhythm  Pulmonary: lungs : no wheezing   Gastrointestinal: BS present.         Lab Results   Component Value Date/Time    HBA1C 5.6 06/24/2023 07:49 AM    HBA1C 5.8 (H) 08/11/2022 06:55 AM       Lab Results   Component Value Date/Time    WBC 6.4 08/11/2022 06:55 AM    HEMOGLOBIN 14.2 08/11/2022 06:55 AM    HEMATOCRIT 42.8 08/11/2022 06:55 AM    MCV 94.9 08/11/2022 06:55 AM    PLATELETCT 271 08/11/2022 06:55 AM         Lab Results   Component Value Date/Time    SODIUM 142 06/24/2023 07:49 AM    POTASSIUM 4.5 06/24/2023 07:49 AM    GLUCOSE 99 06/24/2023 07:49 AM    BUN 22 06/24/2023 07:49 AM    CREATININE 0.70 06/24/2023 07:49 AM       Lab Results   Component Value Date/Time    CHOLSTRLTOT 196 06/24/2023 07:49 AM    TRIGLYCERIDE 58 06/24/2023 07:49 AM    HDL 57 06/24/2023 07:49 AM     (H) 06/24/2023 07:49 AM       Lab Results   Component Value Date/Time    ALTSGPT 17 09/10/2021 06:14 AM             Assessment and Plan     1. Prostate cancer (HCC)  Chronic condition.  Status post prostatectomy.  Patient presently doing well.  He denies fever chills dysuria or urinary frequency or hematuria.  Recommend to keep appointment to follow-up with urologist.    2. Dyslipidemia  - ALANINE AMINO-TRANS; Future  - Lipid Profile; Future  Chronic condition.  Current status unclear.  Lab test ordered for follow-up.  Recommend low-fat low-cholesterol diet.    3. Essential hypertension  - Basic Metabolic Panel; Future  - MICROALBUMIN CREAT RATIO URINE; Future  - CBC WITHOUT DIFFERENTIAL; Future  Chronic condition.  Stable.  Continue amlodipine 5 mg daily.    4. Idiopathic chronic gout of multiple sites without tophus  - URIC ACID; Future  Chronic condition.  Stable.  Continue allopurinol 300 Mg daily    5. Prediabetes  Chronic condition.  Current status unclear.  Lab test ordered for follow-up.    6. Other male erectile  dysfunction  Stable.  Continue to take Cialis 5 mg daily as needed    7. Need for vaccination  - INFLUENZA VACCINE QUAD INJ (PF)    8. Polyp of colon, unspecified part of colon, unspecified type  Patient to repeat colonoscopy in 26.        Attestation: I spent:   36  min -  That includes time for chart review before the visit, the actual patient visit, and time spent on documentation in EMR after the visit.  Chart review/prep, review of other providers' records, imaging/lab review, face-to-face time for history/examination, pt's counseling/education, ordering, prescribing,  review of results/meds/ treatment plan with patient, and care coordination.                 Please note that this dictation was created using voice recognition software. I have made every reasonable attempt to correct obvious errors, but I expect that there are errors of grammar and possibly content that I did not discover before finalizing the note.    Aleksandr Parrish MD  Internal Medicine  [facility] care [facility]

## 2024-02-23 ENCOUNTER — TELEPHONE (OUTPATIENT)
Dept: MEDICAL GROUP | Facility: PHYSICIAN GROUP | Age: 59
End: 2024-02-23
Payer: COMMERCIAL

## 2024-02-23 NOTE — TELEPHONE ENCOUNTER
Name: Fuad Hwang  Phone number: [phone] (home)     Message: Called patient, no answer, left voicemail letting him know that his Medical Release Form was signed and ready for .

## 2024-03-07 DIAGNOSIS — Q24.9 CONGENITAL HEART DISEASE: ICD-10-CM

## 2024-03-25 ENCOUNTER — OFFICE VISIT (OUTPATIENT)
Dept: SLEEP MEDICINE | Facility: MEDICAL CENTER | Age: 59
End: 2024-03-25
Attending: INTERNAL MEDICINE
Payer: COMMERCIAL

## 2024-03-25 VITALS
HEART RATE: 57 BPM | BODY MASS INDEX: 25.05 KG/M2 | HEIGHT: 70 IN | OXYGEN SATURATION: 97 % | RESPIRATION RATE: 16 BRPM | SYSTOLIC BLOOD PRESSURE: 114 MMHG | DIASTOLIC BLOOD PRESSURE: 62 MMHG | WEIGHT: 175 LBS

## 2024-03-25 DIAGNOSIS — G47.33 OSA (OBSTRUCTIVE SLEEP APNEA): Primary | ICD-10-CM

## 2024-03-25 DIAGNOSIS — R06.83 SNORING: ICD-10-CM

## 2024-03-25 DIAGNOSIS — G47.33 OSA ON CPAP: ICD-10-CM

## 2024-03-25 DIAGNOSIS — R63.4 WEIGHT REDUCTION: ICD-10-CM

## 2024-03-25 PROCEDURE — 3074F SYST BP LT 130 MM HG: CPT | Performed by: STUDENT IN AN ORGANIZED HEALTH CARE EDUCATION/TRAINING PROGRAM

## 2024-03-25 PROCEDURE — 3078F DIAST BP <80 MM HG: CPT | Performed by: STUDENT IN AN ORGANIZED HEALTH CARE EDUCATION/TRAINING PROGRAM

## 2024-03-25 PROCEDURE — 99212 OFFICE O/P EST SF 10 MIN: CPT | Performed by: STUDENT IN AN ORGANIZED HEALTH CARE EDUCATION/TRAINING PROGRAM

## 2024-03-25 PROCEDURE — 99204 OFFICE O/P NEW MOD 45 MIN: CPT | Performed by: STUDENT IN AN ORGANIZED HEALTH CARE EDUCATION/TRAINING PROGRAM

## 2024-03-25 NOTE — PROGRESS NOTES
[facility] Sleep Center Consult Note     Date: 3/25/2024 / Time: 9:45 AM      Thank you for requesting a sleep medicine consultation on Fuad Hwang at the sleep center. Presents today with the chief complaints of snoring and history of obstructive sleep apnea. He is referred by Aleksandr Parrish M.D.  [address] [place] [phone] for evaluation and treatment of obstructive sleep apnea.     HISTORY OF PRESENT ILLNESS:     Fuad Hwang is a 58 y.o. male with hypertension, gout, history of prostate cancer, congenital heart disease, and history of obstructive sleep apnea on CPAP.  Presents to Sleep Clinic for evaluation of sleep.     He was diagnosed with obstructive sleep apnea around 2014/2015 via in lab sleep study in [place].  Following diagnosis he was placed on a CPAP machine which she used for several years.  He stopped using his CPAP machine a few years ago when it started to malfunction and not work properly.  He reports since his last sleep study he has lost approximately 100 pounds with diet and exercise.  He has found that his sleep has improved.  He still occasionally has sleepiness during the day and has rare instances of dozing off at work around lunchtime.    He currently sleeps alone.  He has been told that he snores in the past.  Has been told that he has choking and gasping and pauses in breathing in the past.  He does occasionally wake with a dry mouth.  He does occasionally have a headache with awakening.  His sleep is restorative.    As per supplemental questionnaire to be scanned or imported into chart:    [place] Sleepiness Score: 4    Sleep Schedule  Bedtime: Weekday 9pm  Weekend 10pm   Wake time: Weekday 4am Weekend 6am  Sleep-onset latency: 5-10min   Awakenings from sleep: 3 - 4  Difficulty falling back asleep: No  Bedroom partner: No  Naps: No  rare dozing off at work     DAYTIME SYMPTOMS:   Excessive daytime sleepiness: No   Daytime fatigue: No   Difficulty  "concentrating: No   Memory problems: No   Irritability:No   Work/school performance issues: No   Sleepiness with driving: No   Caffeine/stimulant use: Yes 1-2 cups coffee   Alcohol use:Yes, How Many? Occasional      SLEEP RELATED SYMPTOMS  Snoring: Yes  Witnessed apnea or gasping/choking: Yes in the past   Dry mouth or mouth breathing: Yes  Sweating: Yes occasional   Teeth grinding/biting: No   Morning headaches: Yes occasionally   Refreshed Upon Awakening: Yes     SLEEP RELATED BEHAVIORS:  Parasomnias (walking, talking, eating, violence): No   Leg kicking: No   Restless legs - \"urge to move\": No   Nightmares: No  Recurrent: No   Dream enactment: No      NARCOLEPSY:  Cataplexy: No   Sleep paralysis: No   Sleep attacks: No   Hypnagogic/hypnopompic hallucinations: No     MEDICAL HISTORY  Past Medical History:   Diagnosis Date    Apnea, sleep     Daytime sleepiness     Snoring         SURGICAL HISTORY  Past Surgical History:   Procedure Laterality Date    APPENDECTOMY      MYRINGOTOMY      SHOULDER ARTHROSCOPY Left         FAMILY HISTORY  Family History   Problem Relation Age of Onset    Heart Disease Mother     Diabetes Mother     Stroke Mother     No Known Problems Father     Cancer Maternal Grandmother         Lung, smoking    Heart Disease Maternal Grandmother     Diabetes Maternal Grandmother     Cancer Maternal Grandfather     No Known Problems Paternal Grandmother     Cancer Paternal Grandfather        SOCIAL HISTORY  Social History     Socioeconomic History    Marital status: Other   Tobacco Use    Smoking status: Never    Smokeless tobacco: Never   Vaping Use    Vaping Use: Never used   Substance and Sexual Activity    Alcohol use: Yes     Comment: occ    Drug use: No    Sexual activity: Not Currently     Comment: Monogamous , 2boys, manager         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 *APPOINTMENT NEEDED* 90 Tablet 3    allopurinol (ZYLOPRIM) 300 MG Tab TAKE 1 TABLET BY MOUTH EVERY DAY 90 Tablet 3    tadalafil (CIALIS) 5 MG tablet tadalafil 5 mg tablet   TAKE 1 TABLET BY MOUTH EVERY DAY FOR 30 DAYS      acetaminophen (TYLENOL) 500 MG Tab Take 500-1,000 mg by mouth every 6 hours as needed.      therapeutic multivitamin-minerals (THERAGRAN-M) Tab Take 1 Tab by mouth every day.      aspirin EC (ECOTRIN) 81 MG Tablet Delayed Response Take 81 mg by mouth every day.       No current facility-administered medications for this visit.       REVIEW OF SYSTEMS  Constitutional: Denies fevers, Denies weight changes  Ears/Nose/Throat/Mouth: Denies nasal congestion or sore throat   Cardiovascular: Denies chest pain  Respiratory: Denies shortness of breath, Denies cough  Gastrointestinal/Hepatic: Denies nausea, vomiting  Sleep: see HPI    Physical Examination:  Vitals/ General Appearance:   Weight/BMI: Body mass index is 25.11 kg/m².  /62 (BP Location: Left arm, Patient Position: Sitting, BP Cuff Size: Adult)   Pulse (!) 57   Resp 16   Ht 1.778 m (5' 10\")   Wt 79.4 kg (175 lb)   SpO2 97%   Vitals:    03/25/24 0942   BP: 114/62   BP Location: Left arm   Patient Position: Sitting   BP Cuff Size: Adult   Pulse: (!) 57   Resp: 16   SpO2: 97%   Weight: 79.4 kg (175 lb)   Height: 1.778 m (5' 10\")       Pt. is alert and oriented to time, place and person. Cooperative and in no apparent distress.     Constitutional: Alert, no distress, well-groomed.  Skin: No rashes in visible areas.  Eye: Round. Conjunctiva clear, lids normal. No icterus.   ENT EXAM  Nasal alae/valves collapsible: No   Nasal septum deviation: No   Nasal turbinate hypertrophy: Left: Grade 1   Right: Grade 1  Hard palate narrow: No   Hard palate high: No   Soft palate/uvula (Mallampati score): 4  Tongue Scalloping: Yes  Retrognathia: No   Micrognathia: No   Cardiovascular:no " murmus/gallops/rubs, normal S1 and S2 heart sounds, regular rate and rhythm  Pulmonary:Clear to auscultation, No wheezes, No crackles.  Neurologic:Awake, alert and oriented x 3, Normal age appropriate gait, No involuntary motions.  Extremities: No clubbing, cyanosis, or edema       ASSESSMENT AND PLAN   Fuad Hwang is a 58 y.o. male with hypertension, gout, history of prostate cancer, congenital heart disease, and history of obstructive sleep apnea on CPAP.  Presents to Sleep Clinic for evaluation of sleep.     1. Fuad Hwang  has hx of Obstructive Sleep Apnea (CLAIRE). Fuad Hwang has symptoms of snoring, choking/gasping during sleep, witnessed apnea, dry mouth, morning headaches, occasional daytime sleepiness. These can interfere with activities of daily living.   Pt has risk factors for CLAIRE include  crowded oropharynx.     The pathophysiology of CLAIRE and the increased risk of cardiovascular morbidity from untreated CLAIRE is discussed in detail with the patient. He  also has HTN, heart disease which can be worsened by CLAIRE.      We have discussed diagnostic options including in-laboratory, attended polysomnography and home sleep testing. We have also discussed treatment options including airway pressurization, reconstructive otolaryngologic surgery, dental appliances and weight management.     Subsequently,treatment options will be discussed based on the diagnostic study. Meanwhile, He is urged to avoid supine sleep, weight gain and alcoholic beverages since all of these can worsen CLAIRE. He is cautioned against drowsy driving. If He feels sleepy while driving, advised must pull over for a break/nap, rather than persist on the road, in the interest of Pt's own safety and that of others on the road.    Given his history of obstructive sleep apnea diagnosed in 2014 and afterwards losing approximately 100 pounds he would benefit from undergoing a in lab sleep study.  Home sleep studies cannot completely  rule out sleep apnea.  There is the potential that his sleep apnea may have resolved which would need to be assessed via in lab sleep study.    Plan  -  He  will be scheduled for an overnight PSG to assess obstructive sleep apnea after 100 pound weight loss  - Follow up 1-2 weeks after sleep study to discuss results and treatment options moving forward   -Advised to reach out via MyChart or by phone with any questions or concerns.     2.  Regarding treatment of other past medical problems and general health maintenance,  Pt is urged to follow up with PCP.      Please note portions of this record was created using voice recognition software. I have made every reasonable attempt to correct obvious errors, but I expect that there are errors of grammar and possibly content I did not discover before finalizing the note.

## 2024-04-08 ENCOUNTER — HOSPITAL ENCOUNTER (OUTPATIENT)
Dept: LAB | Facility: MEDICAL CENTER | Age: 59
End: 2024-04-08
Attending: INTERNAL MEDICINE
Payer: COMMERCIAL

## 2024-04-08 ENCOUNTER — HOSPITAL ENCOUNTER (OUTPATIENT)
Dept: LAB | Facility: MEDICAL CENTER | Age: 59
End: 2024-04-08
Attending: UROLOGY
Payer: COMMERCIAL

## 2024-04-08 DIAGNOSIS — E55.9 VITAMIN D DEFICIENCY: ICD-10-CM

## 2024-04-08 DIAGNOSIS — R73.03 PREDIABETES: Chronic | ICD-10-CM

## 2024-04-08 DIAGNOSIS — E78.5 DYSLIPIDEMIA: Chronic | ICD-10-CM

## 2024-04-08 DIAGNOSIS — M1A.09X0 IDIOPATHIC CHRONIC GOUT OF MULTIPLE SITES WITHOUT TOPHUS: Chronic | ICD-10-CM

## 2024-04-08 DIAGNOSIS — I10 ESSENTIAL HYPERTENSION: Chronic | ICD-10-CM

## 2024-04-08 LAB
25(OH)D3 SERPL-MCNC: 35 NG/ML (ref 30–100)
ALT SERPL-CCNC: 10 U/L (ref 2–50)
ANION GAP SERPL CALC-SCNC: 9 MMOL/L (ref 7–16)
BUN SERPL-MCNC: 18 MG/DL (ref 8–22)
CALCIUM SERPL-MCNC: 9.6 MG/DL (ref 8.5–10.5)
CHLORIDE SERPL-SCNC: 100 MMOL/L (ref 96–112)
CHOLEST SERPL-MCNC: 186 MG/DL (ref 100–199)
CO2 SERPL-SCNC: 30 MMOL/L (ref 20–33)
CREAT SERPL-MCNC: 0.67 MG/DL (ref 0.5–1.4)
CREAT UR-MCNC: 286.07 MG/DL
ERYTHROCYTE [DISTWIDTH] IN BLOOD BY AUTOMATED COUNT: 50.1 FL (ref 35.9–50)
EST. AVERAGE GLUCOSE BLD GHB EST-MCNC: 108 MG/DL
GFR SERPLBLD CREATININE-BSD FMLA CKD-EPI: 106 ML/MIN/1.73 M 2
GLUCOSE SERPL-MCNC: 91 MG/DL (ref 65–99)
HBA1C MFR BLD: 5.4 % (ref 4–5.6)
HCT VFR BLD AUTO: 42.3 % (ref 42–52)
HDLC SERPL-MCNC: 63 MG/DL
HGB BLD-MCNC: 14.6 G/DL (ref 14–18)
LDLC SERPL CALC-MCNC: 108 MG/DL
MCH RBC QN AUTO: 33.3 PG (ref 27–33)
MCHC RBC AUTO-ENTMCNC: 34.5 G/DL (ref 32.3–36.5)
MCV RBC AUTO: 96.4 FL (ref 81.4–97.8)
MICROALBUMIN UR-MCNC: 1.7 MG/DL
MICROALBUMIN/CREAT UR: 6 MG/G (ref 0–30)
PLATELET # BLD AUTO: 264 K/UL (ref 164–446)
PMV BLD AUTO: 9.3 FL (ref 9–12.9)
POTASSIUM SERPL-SCNC: 4.1 MMOL/L (ref 3.6–5.5)
PSA SERPL-MCNC: <0.02 NG/ML (ref 0–4)
RBC # BLD AUTO: 4.39 M/UL (ref 4.7–6.1)
SODIUM SERPL-SCNC: 139 MMOL/L (ref 135–145)
TRIGL SERPL-MCNC: 76 MG/DL (ref 0–149)
URATE SERPL-MCNC: 4.5 MG/DL (ref 2.5–8.3)
WBC # BLD AUTO: 7.2 K/UL (ref 4.8–10.8)

## 2024-04-08 PROCEDURE — 80061 LIPID PANEL: CPT

## 2024-04-08 PROCEDURE — 84153 ASSAY OF PSA TOTAL: CPT

## 2024-04-08 PROCEDURE — 83036 HEMOGLOBIN GLYCOSYLATED A1C: CPT

## 2024-04-08 PROCEDURE — 84460 ALANINE AMINO (ALT) (SGPT): CPT

## 2024-04-08 PROCEDURE — 82570 ASSAY OF URINE CREATININE: CPT

## 2024-04-08 PROCEDURE — 85027 COMPLETE CBC AUTOMATED: CPT

## 2024-04-08 PROCEDURE — 80048 BASIC METABOLIC PNL TOTAL CA: CPT

## 2024-04-08 PROCEDURE — 82306 VITAMIN D 25 HYDROXY: CPT

## 2024-04-08 PROCEDURE — 36415 COLL VENOUS BLD VENIPUNCTURE: CPT

## 2024-04-08 PROCEDURE — 82043 UR ALBUMIN QUANTITATIVE: CPT

## 2024-04-08 PROCEDURE — 84550 ASSAY OF BLOOD/URIC ACID: CPT

## 2024-04-12 ENCOUNTER — OFFICE VISIT (OUTPATIENT)
Dept: CARDIOLOGY | Facility: MEDICAL CENTER | Age: 59
End: 2024-04-12
Attending: INTERNAL MEDICINE
Payer: COMMERCIAL

## 2024-04-12 VITALS
WEIGHT: 173 LBS | HEIGHT: 70 IN | RESPIRATION RATE: 16 BRPM | DIASTOLIC BLOOD PRESSURE: 62 MMHG | BODY MASS INDEX: 24.77 KG/M2 | SYSTOLIC BLOOD PRESSURE: 108 MMHG | HEART RATE: 66 BPM | OXYGEN SATURATION: 98 %

## 2024-04-12 DIAGNOSIS — E78.5 DYSLIPIDEMIA: Chronic | ICD-10-CM

## 2024-04-12 DIAGNOSIS — I25.10 CORONARY ARTERY DISEASE INVOLVING NATIVE CORONARY ARTERY OF NATIVE HEART WITHOUT ANGINA PECTORIS: ICD-10-CM

## 2024-04-12 DIAGNOSIS — I10 ESSENTIAL HYPERTENSION: Chronic | ICD-10-CM

## 2024-04-12 DIAGNOSIS — Q24.5 ANOMALOUS RIGHT CORONARY ARTERY: ICD-10-CM

## 2024-04-12 LAB — EKG IMPRESSION: NORMAL

## 2024-04-12 PROCEDURE — 3078F DIAST BP <80 MM HG: CPT | Performed by: INTERNAL MEDICINE

## 2024-04-12 PROCEDURE — 99204 OFFICE O/P NEW MOD 45 MIN: CPT | Performed by: INTERNAL MEDICINE

## 2024-04-12 PROCEDURE — 3074F SYST BP LT 130 MM HG: CPT | Performed by: INTERNAL MEDICINE

## 2024-04-12 PROCEDURE — 93010 ELECTROCARDIOGRAM REPORT: CPT | Performed by: INTERNAL MEDICINE

## 2024-04-12 PROCEDURE — 93005 ELECTROCARDIOGRAM TRACING: CPT | Performed by: INTERNAL MEDICINE

## 2024-04-12 PROCEDURE — 99212 OFFICE O/P EST SF 10 MIN: CPT | Performed by: INTERNAL MEDICINE

## 2024-04-12 ASSESSMENT — ENCOUNTER SYMPTOMS
PALPITATIONS: 0
NERVOUS/ANXIOUS: 0
MYALGIAS: 0
WEIGHT LOSS: 0
GASTROINTESTINAL NEGATIVE: 1
ABDOMINAL PAIN: 0
COUGH: 0
FEVER: 0
FOCAL WEAKNESS: 0
MUSCULOSKELETAL NEGATIVE: 1
VOMITING: 0
SHORTNESS OF BREATH: 0
BLURRED VISION: 0
CARDIOVASCULAR NEGATIVE: 1
RESPIRATORY NEGATIVE: 1
CHILLS: 0
PSYCHIATRIC NEGATIVE: 1
NEUROLOGICAL NEGATIVE: 1
HEADACHES: 0
CLAUDICATION: 0
BRUISES/BLEEDS EASILY: 0
DEPRESSION: 0
CONSTITUTIONAL NEGATIVE: 1
EYES NEGATIVE: 1
DIZZINESS: 0
NAUSEA: 0
DOUBLE VISION: 0
WEAKNESS: 0

## 2024-04-12 NOTE — PROGRESS NOTES
Chief Complaint   Patient presents with    Hypertension     NP    Dyslipidemia       Subjective     Fuad Hwang is a 58 y.o. male who presents today as a consult from Aleksandr Corral for coronary artery disease.     Thank you for allowing me to evaluate Mr. Hwang, who as you know is a 58 year old male with coronary artery disease, hypertension and hyperlipidemia, prediabetes, lifelong nonsmoker, family history of coronary artery disease with his mother. .He is clinically doing well. He denies chest pain, shortness of breath, palpitations, nausea/vomiting or diaphoresis. He keeps active walking 2 to 6 miles per day. He works as a .     Past Medical History:   Diagnosis Date    Apnea, sleep     Daytime sleepiness     Hyperlipidemia     Hypertension     Snoring      Past Surgical History:   Procedure Laterality Date    APPENDECTOMY      MYRINGOTOMY      SHOULDER ARTHROSCOPY Left      Family History   Problem Relation Age of Onset    Heart Disease Mother     Diabetes Mother     Stroke Mother     No Known Problems Father     Cancer Maternal Grandmother         Lung, smoking    Heart Disease Maternal Grandmother     Diabetes Maternal Grandmother     Cancer Maternal Grandfather     No Known Problems Paternal Grandmother     Cancer Paternal Grandfather      Social History     Socioeconomic History    Marital status: Other     Spouse name: Not on file    Number of children: Not on file    Years of education: Not on file    Highest education level: Not on file   Occupational History    Not on file   Tobacco Use    Smoking status: Never    Smokeless tobacco: Never   Vaping Use    Vaping Use: Never used   Substance and Sexual Activity    Alcohol use: Yes     Comment: occ    Drug use: No    Sexual activity: Not Currently     Comment: Monogamous , 2boys, manager    Other Topics Concern    Not on file   Social History Narrative    Not on file     Social Determinants of Health     Financial  Resource Strain: Not on file   Food Insecurity: Not on file   Transportation Needs: Not on file   Physical Activity: Not on file   Stress: Not on file   Social Connections: Not on file   Intimate Partner Violence: Not on file   Housing Stability: Not on file     No Known Allergies    CARDIAC STUDIES/PROCEDURES:    Outpatient Encounter Medications as of 4/12/2024   Medication Sig Dispense Refill    amLODIPine (NORVASC) 5 MG Tab TAKE 1 TABLET BY MOUTH EVERY DAY. *APPOINTMENT NEEDED* 90 Tablet 3    allopurinol (ZYLOPRIM) 300 MG Tab TAKE 1 TABLET BY MOUTH EVERY DAY 90 Tablet 3    tadalafil (CIALIS) 5 MG tablet tadalafil 5 mg tablet   TAKE 1 TABLET BY MOUTH EVERY DAY FOR 30 DAYS      acetaminophen (TYLENOL) 500 MG Tab Take 500-1,000 mg by mouth every 6 hours as needed.      therapeutic multivitamin-minerals (THERAGRAN-M) Tab Take 1 Tab by mouth every day.      aspirin EC (ECOTRIN) 81 MG Tablet Delayed Response Take 81 mg by mouth every day.       No facility-administered encounter medications on file as of 4/12/2024.     Review of Systems   Constitutional: Negative.  Negative for chills, fever, malaise/fatigue and weight loss.   HENT: Negative.  Negative for hearing loss.    Eyes: Negative.  Negative for blurred vision and double vision.   Respiratory: Negative.  Negative for cough and shortness of breath.    Cardiovascular: Negative.  Negative for chest pain, palpitations, claudication and leg swelling.   Gastrointestinal: Negative.  Negative for abdominal pain, nausea and vomiting.   Genitourinary: Negative.  Negative for dysuria and urgency.   Musculoskeletal: Negative.  Negative for joint pain and myalgias.   Skin: Negative.  Negative for itching and rash.   Neurological: Negative.  Negative for dizziness, focal weakness, weakness and headaches.   Endo/Heme/Allergies: Negative.  Does not bruise/bleed easily.   Psychiatric/Behavioral: Negative.  Negative for depression. The patient is not nervous/anxious.          "      Objective     /62 (BP Location: Left arm, Patient Position: Sitting, BP Cuff Size: Adult)   Pulse 66   Resp 16   Ht 1.778 m (5' 10\")   Wt 78.5 kg (173 lb)   SpO2 98%   BMI 24.82 kg/m²     Physical Exam  Constitutional:       Appearance: Normal appearance. He is well-developed and normal weight.   HENT:      Head: Normocephalic and atraumatic.   Neck:      Vascular: No JVD.   Cardiovascular:      Rate and Rhythm: Normal rate and regular rhythm.      Heart sounds: Normal heart sounds.   Pulmonary:      Effort: Pulmonary effort is normal.      Breath sounds: Normal breath sounds.   Abdominal:      General: Bowel sounds are normal.      Palpations: Abdomen is soft.      Comments: No hepatosplenomegaly.   Musculoskeletal:         General: Normal range of motion.   Lymphadenopathy:      Cervical: No cervical adenopathy.   Skin:     General: Skin is warm and dry.   Neurological:      Mental Status: He is alert and oriented to person, place, and time.            CARDIAC STUDIES/PROCEDURES:    CTA OF HEART at [place] (2014)  Right coronary artery There is an anomalous origin of the right coronary artery  with an origin in the left coronary cusp. The right coronary artery has an eccentric slit like ostium with an MLA of 11 mm2. There is mild motion artifact in the mid right coronary artery.  (study result reviewed)      ECHOCARDIOGRAM CONCLUSIONS (09/16/21)  No prior study is available for comparison.   Normal left ventricular size, thickness, systolic function, and diastolic function.  Structurally normal aortic valve without stenosis.   Trace aortic insufficiency.  Mild pulmonic insufficiency.  (study result reviewed)     EKG was ordered for coronary artery disease, performed on (04/12/24) was reviewed: EKG, personally interpreted shows sinus bradycardia with right bundle branch block.     Laboratory results of (04/08/24) were reviewed. Cholesterol profile of 186/76/63/108 mg/dL noted.     Assessment " & Plan     1. Coronary artery disease involving native coronary artery of native heart without angina pectoris        2. Anomalous right coronary artery        3. Essential hypertension  EKG      4. Dyslipidemia            Medical Decision Making: Today's Assessment/Status/Plan:        Coronary artery disease with anomalous right coronary artery (with unusual description on his remote CTA.: We will perform CT coronary angiogram and a myocardial perfusion imaging study.  Hypertension: Blood pressure is well controlled. We will continue with amlodipine.  Hyperlipidemia: Currently not well controlled on strict diet and exercise therapy. We will consider statin therapy after above tests are performed.    We will follow up in 3 months.    Thank you for this consult.

## 2024-04-13 ENCOUNTER — SLEEP STUDY (OUTPATIENT)
Dept: SLEEP MEDICINE | Facility: MEDICAL CENTER | Age: 59
End: 2024-04-13
Attending: STUDENT IN AN ORGANIZED HEALTH CARE EDUCATION/TRAINING PROGRAM
Payer: COMMERCIAL

## 2024-04-13 DIAGNOSIS — R06.83 SNORING: ICD-10-CM

## 2024-04-13 DIAGNOSIS — R63.4 WEIGHT REDUCTION: ICD-10-CM

## 2024-04-13 DIAGNOSIS — G47.33 OSA (OBSTRUCTIVE SLEEP APNEA): ICD-10-CM

## 2024-04-13 PROCEDURE — 95811 POLYSOM 6/>YRS CPAP 4/> PARM: CPT | Performed by: STUDENT IN AN ORGANIZED HEALTH CARE EDUCATION/TRAINING PROGRAM

## 2024-04-15 NOTE — PROCEDURES
Patient: YAKELIN FIORE  ID: [id number] Date: 4/13/2024   MONTAGE: Standard  STUDY TYPE: Split Night  RECORDING TECHNIQUE:   After the scalp was prepared, gold plated electrodes were applied to the scalp according to the International 10-20 System. EEG (electroencephalogram) was continuously monitored from the O1-M2, O2-M1, C3-M2, C4-M1, F3-M2, and F4-M1. EOGs (electrooculograms) were monitored by electrodes placed at the left and right outer canthi. Chin EMG (electromyogram) was monitored by electrodes placed on the mentalis and sub-mentalis muscles. Nasal and oral airflow were monitored using a triple port thermocouple as well as oronasal pressure transducer. Respiratory effort was measured by inductive plethysmography technology employing abdominal and thoracic belts. Blood oxygen saturation and pulse were monitored by pulse oximetry. Heart rhythm was monitored by surface electrocardiogram. Leg EMG was studied using surface electrodes placed on left and right anterior tibialis. A microphone was used to monitor tracheal sounds and snoring. Body position was monitored and documented by technician observation.   SCORING CRITERIA:   A modification of the AASM manual for scoring of sleep and associated events was used. Obstructive apneas were scored by cessation of airflow for at least 10 seconds with continuing respiratory effort. Central apneas were scored by cessation of airflow for at least 10 seconds with no respiratory effort. Hypopneas were scored by a 30% or more reduction in airflow for at least 10 seconds accompanied by arterial oxygen desaturation of 3% or an arousal. For CMS (Medicare) patients, per AASM rule 1B, hypopneas are scored by 30% with mild reduction in airflow for at least 10 seconds accompanied by arterial saturation decreased at 4%.    DIAGNOSTIC  Study start time was 10:14:04 PM. Diagnostic recording time was 134 minutes with a total sleep time of 124 minutes resulting in a sleep efficiency of  92.19%%. Sleep latency from the start of the study was 04 minutes and the latency from sleep to REM was 69 minutes. In total,28 arousals were scored for an arousal index of 13.5.  Respiratory:  There were a total of 49 apneas consisting of 47 obstructive apneas, 0 mixed apneas, and 2 central apneas. A total of 73 hypopneas were scored. The apnea index was 23.71 per hour and the hypopnea index was 35.32 per hour resulting in an overall AHI of 59.03. AHI during REM was 60.0 and AHI while supine was 59.03.  Oximetry:  There was a mean oxygen saturation of 87.0%. The minimum oxygen saturation during NREM sleep was 54.0% and in REM was 60.0%. Time spent during sleep with oxygen saturations <88% was 72.5 minutes.   Cardiac:  The highest heart rate seen while awake was 83 BPM while the highest heart rate during sleep was 86 BPM with an average sleeping heart rate of 55 BPM.  Limb Movements:  There were a total of 0 PLMs during sleep, which resulted in a PLM index of 0.0. There were 3 PLMs associated with arousals which resulted in a PLMS arousal index of 1.5.    TREATMENT:  Treatment recording time was 5h 19.5m (319 minutes) with a total sleep time of 4h 17.0m (257 minutes) resulting in a sleep efficiency of 80.4%. Sleep latency from the start of treatment was 07 minutes and REM latency from sleep onset was 1h 11.0m. The patient had 188 arousals in total for an arousal index of 43.9.  Respiratory:   There were 83 apneas in total consisting of 18 obstructive apneas, 65 central apneas, and 0 mixed apneas for an apnea index of 19.38. The patient had 149 hypopneas in total, which resulted in a hypopnea index of 34.79. The overall AHI was 54.16, with a REM AHI of 51.61, and a supine AHI of 54.16.  Central apneas accounted for 28% of respiratory events  Oximetry:  The mean SaO2 during treatment was 92.0%. The minimum oxygen saturation in NREM was 82.0 % and in REM was 72.0%. Patient spent 31.6 minutes of TST with SaO2  <88%.  Cardiac:  The highest heart rate during sleep was 86 BPM with an average sleeping heart rate of 55BPM.  Limb Movements:  There were a total of 0 PLMS during titration sleep time that resulted in an index of 0.0. There were 0 PLMS associated with arousals. This resulted in a PLM arousal index of 0.0.  Titration:   CPAP was tried from 5 to 13. BiPAP was tried from 15/11 to 21/17. ASV was tried from EPAP: 5, PS: 3/15 to EPAP: 10, PS: 3/15  This was a fully attended sleep study. This test was technically adequate. This patient was titrated on CPAP starting at 5 cm of water pressure. Patient was titrated up to ASV EPAP 10 PS 3-15 cm of water pressure. Incomplete titration.       Impression:  1.  Severe obstructive sleep apnea with an overall AHI of 59 events an hour  2.  Significant nocturnal hypoxia seen during diagnostic portion with time at or below 88% saturation of 73 minutes.  Minimum oxygen saturation of 54%  3.  Supine REM sleep was seen during both diagnostic and treatment portion   4.  Patient was tried on CPAP, BiPAP and ASV after meeting split-night protocol  5.  Incomplete control of respiratory events on CPAP, BiPAP and ASV  6.  Does not meet criteria for complex sleep apnea based on treatment portion of study    Recommendations:  I recommend the patient return for a BiPAP titration due to the severity of sleep apnea and sleep-related hypoxia.  Patient may require ASV therapy with higher EPAP pressures if complex sleep apnea is seen on BiPAP therapy on repeat titration    In some cases alternative treatment options may be proven effective in resolving sleep apnea. These options include upper airway surgery, the use of a dental orthotic, weight loss, or positional therapy. Clinical correlation is required. In general patients with sleep apnea are advised to avoid alcohol, sedatives and not to operate a motor vehicle while drowsy.  Untreated sleep apnea increases the risk for cardiovascular and  neurovascular disease.

## 2024-04-23 DIAGNOSIS — Z01.812 PRE-PROCEDURE LAB EXAM: ICD-10-CM

## 2024-04-23 NOTE — PROGRESS NOTES
Request received to review order/ protocol for coronary CTA. Scan approved for [employer] scanner. Upon review of available medical records, the following orders have been placed:    Order placed for outpatient, non fasting creatinine blood draw to check kidney function within 30 days prior to contrast administration for coronary CTA if determined medically necessary by CT staff. Instructions for timing of blood test to be given by scheduling/ CT facility team.    Do not take tadalafil for 7 days prior to scan and for one day after due to potential for life threatening medication interaction with NTG given during scan. Then resume.     This CT study may be scheduled through [facility] Scheduling at , option 2.

## 2024-04-26 ENCOUNTER — HOSPITAL ENCOUNTER (OUTPATIENT)
Dept: RADIOLOGY | Facility: MEDICAL CENTER | Age: 59
End: 2024-04-26
Attending: INTERNAL MEDICINE
Payer: COMMERCIAL

## 2024-04-26 DIAGNOSIS — Q24.5 ANOMALOUS RIGHT CORONARY ARTERY: ICD-10-CM

## 2024-04-26 DIAGNOSIS — I25.10 CORONARY ARTERY DISEASE INVOLVING NATIVE CORONARY ARTERY OF NATIVE HEART WITHOUT ANGINA PECTORIS: ICD-10-CM

## 2024-04-26 DIAGNOSIS — I10 ESSENTIAL HYPERTENSION: Chronic | ICD-10-CM

## 2024-04-26 PROCEDURE — A9502 TC99M TETROFOSMIN: HCPCS

## 2024-05-06 ENCOUNTER — OFFICE VISIT (OUTPATIENT)
Dept: SLEEP MEDICINE | Facility: MEDICAL CENTER | Age: 59
End: 2024-05-06
Attending: NURSE PRACTITIONER
Payer: COMMERCIAL

## 2024-05-06 VITALS
HEIGHT: 70 IN | BODY MASS INDEX: 23.34 KG/M2 | DIASTOLIC BLOOD PRESSURE: 68 MMHG | OXYGEN SATURATION: 99 % | RESPIRATION RATE: 12 BRPM | WEIGHT: 163 LBS | SYSTOLIC BLOOD PRESSURE: 112 MMHG | HEART RATE: 59 BPM

## 2024-05-06 DIAGNOSIS — G47.33 OSA (OBSTRUCTIVE SLEEP APNEA): ICD-10-CM

## 2024-05-06 PROCEDURE — 3074F SYST BP LT 130 MM HG: CPT | Performed by: NURSE PRACTITIONER

## 2024-05-06 PROCEDURE — 3078F DIAST BP <80 MM HG: CPT | Performed by: NURSE PRACTITIONER

## 2024-05-06 PROCEDURE — 99214 OFFICE O/P EST MOD 30 MIN: CPT | Performed by: NURSE PRACTITIONER

## 2024-05-06 NOTE — PROGRESS NOTES
Chief Complaint   Patient presents with    Follow-Up     Last seen 03/25/2024 Malachi Vyas   RESULTS       HPI:  Fuad Hwang is a 58 y.o. year old male here today for follow-up on CLAIRE with sleep study results.  Last seen 3/25/2024. He was diagnosed with obstructive sleep apnea around 2014/2015 via in lab sleep study in [place].  Following diagnosis he was placed on a CPAP machine which she used for several years.  He stopped using his CPAP machine a few years ago when it started to malfunction and not work properly.  He reports since his last sleep study he has lost approximately 100 pounds with diet and exercise.  He has found that his sleep has improved.  He still occasionally has sleepiness during the day and has rare instances of dozing off at work around lunchtime.     He currently sleeps alone.  He has been told that he snores in the past.  Has been told that he has choking and gasping and pauses in breathing in the past.  He does occasionally wake with a dry mouth.  He does occasionally have a headache with awakening.  His sleep is restorative.    Split-night sleep study (4/13/2024):  1.  Severe obstructive sleep apnea with an overall AHI of 59 events an hour  2.  Significant nocturnal hypoxia seen during diagnostic portion with time at or below 88% saturation of 73 minutes.  Minimum oxygen saturation of 54%  3.  Supine REM sleep was seen during both diagnostic and treatment portion   4.  Patient was tried on CPAP, BiPAP and ASV after meeting split-night protocol  5.  Incomplete control of respiratory events on CPAP, BiPAP and ASV  6.  Does not meet criteria for complex sleep apnea based on treatment portion of study     Recommendations:  I recommend the patient return for a BiPAP titration due to the severity of sleep apnea and sleep-related hypoxia.  Patient may require ASV therapy with higher EPAP pressures if complex sleep apnea is seen on BiPAP therapy on repeat titration         ROS: As  "per HPI and otherwise negative if not stated.    Past Medical History:   Diagnosis Date    Apnea, sleep     Daytime sleepiness     Hyperlipidemia     Hypertension     Snoring        Past Surgical History:   Procedure Laterality Date    APPENDECTOMY      MYRINGOTOMY      SHOULDER ARTHROSCOPY Left        Family History   Problem Relation Age of Onset    Heart Disease Mother     Diabetes Mother     Stroke Mother     No Known Problems Father     Cancer Maternal Grandmother         Lung, smoking    Heart Disease Maternal Grandmother     Diabetes Maternal Grandmother     Cancer Maternal Grandfather     No Known Problems Paternal Grandmother     Cancer Paternal Grandfather        Allergies as of 05/06/2024    (No Known Allergies)        Vitals:  /68 (BP Location: Left arm, Patient Position: Sitting, BP Cuff Size: Adult)   Pulse (!) 59   Resp 12   Ht 1.778 m (5' 10\")   Wt 73.9 kg (163 lb)   SpO2 99%     Current medications as of today   Current Outpatient Medications   Medication Sig Dispense Refill    amLODIPine (NORVASC) 5 MG Tab TAKE 1 TABLET BY MOUTH EVERY DAY. *APPOINTMENT NEEDED* 90 Tablet 3    allopurinol (ZYLOPRIM) 300 MG Tab TAKE 1 TABLET BY MOUTH EVERY DAY 90 Tablet 3    acetaminophen (TYLENOL) 500 MG Tab Take 500-1,000 mg by mouth every 6 hours as needed.      therapeutic multivitamin-minerals (THERAGRAN-M) Tab Take 1 Tab by mouth every day.      aspirin EC (ECOTRIN) 81 MG Tablet Delayed Response Take 81 mg by mouth every day.      tadalafil (CIALIS) 5 MG tablet tadalafil 5 mg tablet   TAKE 1 TABLET BY MOUTH EVERY DAY FOR 30 DAYS       No current facility-administered medications for this visit.         Physical Exam:   Gen:           Alert and oriented, No apparent distress. Mood and affect appropriate, normal interaction with examiner.  Eyes:          PERRL, EOM intact, sclere white, conjunctive moist.  Ears:          Not examined.   Hearing:     Grossly intact.  Nose:          Normal, no lesions or " deformities.  Dentition:    Good dentition.  Oropharynx:   Tongue normal, posterior pharynx without erythema or exudate.  Neck:        Supple, trachea midline, no masses.  Respiratory Effort: No intercostal retractions or use of accessory muscles.   Lung Auscultation:      Clear to auscultation bilaterally; no rales, rhonchi or wheezing.  CV:            Regular rate and rhythm. No murmurs, rubs or gallops.  Abd:           Not examined.   Lymphadenopathy: Not examined.  Gait and Station: Normal.  Digits and Nails: No clubbing, cyanosis, petechiae, or nodes.   Cranial Nerves: II-XII grossly intact.  Skin:        No rashes, lesions or ulcers noted.               Ext:           No cyanosis or edema.      Assessment:  1. CLAIRE (obstructive sleep apnea)  Polysomnography Titration        Plan:   I reviewed with the patient the pathophysiology of obstructive sleep apnea, as well as potential cardiac and neurologic risks associated with untreated sleep apnea including CAD, HTN, pulmonary arterial hypertension, cardiac arrhythmias, heart attack or stroke.  CLAIRE patient's have increased risk of motor vehicle accidents, DM type II, chronic kidney disease and nonalcoholic liver disease.  He is cautioned against driving while sleepy for his safety and safety of others on the road. We reviewed treatment modalities for sleep apnea including CPAP/BiPAP therapy, ENT referral, dental appliance.      Study showed evidence of Severe obstructive sleep apnea with an overall AHI of 59 events an hour with  Significant nocturnal hypoxia seen during diagnostic portion with time at or below 88% saturation of 73 minutes.  Minimum oxygen saturation of 54%. Supine REM sleep was seen during both diagnostic and treatment portion  Patient was tried on CPAP, BiPAP and ASV after meeting split-night protocol. Incomplete control of respiratory events on CPAP, BiPAP and ASV. Does not meet criteria for complex sleep apnea based on treatment portion of study.       Recommendations:  I recommend the patient return for a BiPAP titration due to the severity of sleep apnea and sleep-related hypoxia.  Patient may require ASV therapy with higher EPAP pressures if complex sleep apnea is seen on BiPAP therapy on repeat titration    Please note that this dictation was created using voice recognition software. I have made every reasonable attempt to correct obvious errors, but it is possible there are errors of grammar and possibly content that I did not discover before finalizing the note.

## 2024-05-21 DIAGNOSIS — Z11.3 ROUTINE SCREENING FOR STI (SEXUALLY TRANSMITTED INFECTION): ICD-10-CM

## 2024-05-26 ENCOUNTER — SLEEP STUDY (OUTPATIENT)
Dept: SLEEP MEDICINE | Facility: MEDICAL CENTER | Age: 59
End: 2024-05-26
Attending: NURSE PRACTITIONER
Payer: COMMERCIAL

## 2024-05-26 DIAGNOSIS — G47.33 OSA (OBSTRUCTIVE SLEEP APNEA): ICD-10-CM

## 2024-05-29 NOTE — PROCEDURES
MONTAGE: Standard  STUDY TYPE: Treatment  RECORDING TECHNIQUE:   After the scalp was prepared, gold plated electrodes were applied to the scalp according to the International 10-20 System. EEG (electroencephalogram) was continuously monitored from the O1-M2, O2-M1, C3-M2, C4-M1, F3-M2, and F4-M1. EOGs (electrooculograms) were monitored by electrodes placed at the left and right outer canthi. Chin EMG (electromyogram) was monitored by electrodes placed on the mentalis and sub-mentalis muscles. Nasal and oral airflow were monitored using a triple port thermocouple as well as oronasal pressure transducer. Respiratory effort was measured by inductive plethysmography technology employing abdominal and thoracic belts. Blood oxygen saturation and pulse were monitored by pulse oximetry. Heart rhythm was monitored by surface electrocardiogram. Leg EMG was studied using surface electrodes placed on left and right anterior tibialis. A microphone was used to monitor tracheal sounds and snoring. Body position was monitored and documented by technician observation.   SCORING CRITERIA:   A modification of the AASM manual for scoring of sleep and associated events was used. Obstructive apneas were scored by cessation of airflow for at least 10 seconds with continuing respiratory effort. Central apneas were scored by cessation of airflow for at least 10 seconds with no respiratory effort. Hypopneas were scored by a 30% or more reduction in airflow for at least 10 seconds accompanied by arterial oxygen desaturation of 3% or an arousal. For CMS (Medicare) patients, per AASM rule 1B, hypopneas are scored by 30% with mild reduction in airflow for at least 10 seconds accompanied by arterial saturation decreased at 4%.    Study start time was 10:06:48 PM. Diagnostic recording time was 7h 16.5m with a total sleep time of 5h 50.0m resulting in a sleep efficiency of 80.18%%. Sleep latency from the start of the study was 02 minutes and the  latency from sleep to REM was 65 minutes. In total,114 arousals were scored for an arousal index of 19.5.  Respiratory:  There were a total of 24 apneas consisting of 17 obstructive apneas, 0 mixed apneas, and 7 central apneas. A total of 34 hypopneas were scored. The apnea index was 4.11 per hour and the hypopnea index was 5.83 per hour resulting in an overall AHI of 9.94. AHI during REM was 11.1 and AHI while supine was 9.94.  Oximetry:  There was a mean oxygen saturation of 94.0%. The minimum oxygen saturation in NREM was 86.0 % and in REM was 89.0%. The patient spent 0.3 minutes of TST with SaO2 <88%.  Cardiac:  The highest heart rate seen while awake was 77 BPM while the highest heart rate during sleep was 63 BPM with an average sleeping heart rate of 47 BPM.  Limb Movements:  There were a total of 4 PLMs during sleep which resulted in a PLMS index of 0.7. Of these, 9 were associated with arousals which resulted in a PLMS arousal index of 1.5.  Titration: Bipap was tried from 12/8-19/14cm H2O.  This was a fully attended sleep study. This test was technically adequate. This patient was titrated on BiPAP starting at 12/8 cm of water pressure. Patient was titrated up to 19/14 cm of water pressure. Patient did best at 12/8 cm of water pressure. Patient spent 129 minutes at that pressure and the AHI was 6.9 which is considered Mild sleep apnea.     Impression:  1.  Patient used BiPAP during night of study  2.  Supine REM sleep was seen on BiPAP therapy  3.  Respiratory events appeared improved on BiPAP therapy    Recommendations:  I recommend auto BiPAP EPAP 8 IPAP 16 pressure support 4 cmH2O.  Patient used a medium [place] Full face mask during night of study.     I also recommend 30 day compliance download to assess the efficacy to the recommended pressure, measure leak, apnea hypopnea index and compliance for further outpatient monitoring and management of PAP therapy. In some cases alternative treatment options  may be proven effective in resolving sleep apnea. These options include upper airway surgery, the use of a dental orthotic, weight loss, or positional therapy. Clinical correlation is required. In general patients with sleep apnea are advised to avoid alcohol, sedatives and not to operate a motor vehicle while drowsy.  Untreated sleep apnea increases the risk for cardiovascular and neurovascular disease.

## 2024-05-30 ENCOUNTER — HOSPITAL ENCOUNTER (OUTPATIENT)
Dept: LAB | Facility: MEDICAL CENTER | Age: 59
End: 2024-05-30
Attending: INTERNAL MEDICINE
Payer: COMMERCIAL

## 2024-05-30 DIAGNOSIS — Z11.3 ROUTINE SCREENING FOR STI (SEXUALLY TRANSMITTED INFECTION): ICD-10-CM

## 2024-05-31 LAB
C TRACH DNA SPEC QL NAA+PROBE: NEGATIVE
HBV SURFACE AB SERPL IA-ACNC: <3.5 MIU/ML (ref 0–10)
HBV SURFACE AG SER QL: NORMAL
HCV AB SER QL: NORMAL
HIV 1+2 AB+HIV1 P24 AG SERPL QL IA: NORMAL
N GONORRHOEA DNA SPEC QL NAA+PROBE: NEGATIVE
SPECIMEN SOURCE: NORMAL
T PALLIDUM AB SER QL IA: NORMAL

## 2024-06-01 LAB — HSV1+2 IGM SER IA-ACNC: 1.29 IV

## 2024-06-04 DIAGNOSIS — B00.9 HERPES SIMPLEX: Chronic | ICD-10-CM

## 2024-06-04 DIAGNOSIS — Z11.3 SCREEN FOR STD (SEXUALLY TRANSMITTED DISEASE): Chronic | ICD-10-CM

## 2024-06-04 RX ORDER — VALACYCLOVIR HYDROCHLORIDE 1 G/1
1000 TABLET, FILM COATED ORAL 2 TIMES DAILY
Qty: 14 TABLET | Refills: 0 | Status: SHIPPED | OUTPATIENT
Start: 2024-06-04

## 2024-07-18 ENCOUNTER — RESEARCH ENCOUNTER (OUTPATIENT)
Dept: RESEARCH | Facility: MEDICAL CENTER | Age: 59
End: 2024-07-18
Payer: COMMERCIAL

## 2024-07-18 DIAGNOSIS — Z00.6 RESEARCH STUDY PATIENT: ICD-10-CM

## 2024-07-22 ENCOUNTER — HOSPITAL ENCOUNTER (OUTPATIENT)
Dept: LAB | Facility: MEDICAL CENTER | Age: 59
End: 2024-07-22
Attending: INTERNAL MEDICINE
Payer: COMMERCIAL

## 2024-07-22 ENCOUNTER — HOSPITAL ENCOUNTER (OUTPATIENT)
Dept: LAB | Facility: MEDICAL CENTER | Age: 59
End: 2024-07-22
Attending: INTERNAL MEDICINE

## 2024-07-22 ENCOUNTER — HOSPITAL ENCOUNTER (OUTPATIENT)
Dept: LAB | Facility: MEDICAL CENTER | Age: 59
End: 2024-07-22
Attending: UROLOGY
Payer: COMMERCIAL

## 2024-07-22 DIAGNOSIS — Z00.6 RESEARCH STUDY PATIENT: ICD-10-CM

## 2024-07-22 DIAGNOSIS — R73.03 PREDIABETES: Chronic | ICD-10-CM

## 2024-07-22 DIAGNOSIS — E78.5 DYSLIPIDEMIA: ICD-10-CM

## 2024-07-22 LAB
ALT SERPL-CCNC: 14 U/L (ref 2–50)
ANION GAP SERPL CALC-SCNC: 11 MMOL/L (ref 7–16)
BUN SERPL-MCNC: 20 MG/DL (ref 8–22)
CALCIUM SERPL-MCNC: 9.7 MG/DL (ref 8.5–10.5)
CHLORIDE SERPL-SCNC: 103 MMOL/L (ref 96–112)
CHOLEST SERPL-MCNC: 178 MG/DL (ref 100–199)
CO2 SERPL-SCNC: 27 MMOL/L (ref 20–33)
CREAT SERPL-MCNC: 0.77 MG/DL (ref 0.5–1.4)
GFR SERPLBLD CREATININE-BSD FMLA CKD-EPI: 102 ML/MIN/1.73 M 2
GLUCOSE SERPL-MCNC: 103 MG/DL (ref 65–99)
HDLC SERPL-MCNC: 60 MG/DL
LDLC SERPL CALC-MCNC: 103 MG/DL
POTASSIUM SERPL-SCNC: 4.3 MMOL/L (ref 3.6–5.5)
PSA SERPL-MCNC: <0.02 NG/ML (ref 0–4)
SODIUM SERPL-SCNC: 141 MMOL/L (ref 135–145)
TRIGL SERPL-MCNC: 73 MG/DL (ref 0–149)

## 2024-07-22 PROCEDURE — 84153 ASSAY OF PSA TOTAL: CPT

## 2024-07-22 PROCEDURE — 80061 LIPID PANEL: CPT

## 2024-07-22 PROCEDURE — 80048 BASIC METABOLIC PNL TOTAL CA: CPT

## 2024-07-22 PROCEDURE — 84460 ALANINE AMINO (ALT) (SGPT): CPT

## 2024-07-22 PROCEDURE — 36415 COLL VENOUS BLD VENIPUNCTURE: CPT

## 2024-07-29 ENCOUNTER — OFFICE VISIT (OUTPATIENT)
Dept: MEDICAL GROUP | Facility: PHYSICIAN GROUP | Age: 59
End: 2024-07-29
Payer: COMMERCIAL

## 2024-07-29 VITALS
WEIGHT: 179 LBS | SYSTOLIC BLOOD PRESSURE: 112 MMHG | DIASTOLIC BLOOD PRESSURE: 64 MMHG | BODY MASS INDEX: 25.62 KG/M2 | HEIGHT: 70 IN | OXYGEN SATURATION: 97 % | TEMPERATURE: 97.9 F | RESPIRATION RATE: 16 BRPM | HEART RATE: 62 BPM

## 2024-07-29 DIAGNOSIS — C61 PROSTATE CANCER (HCC): Chronic | ICD-10-CM

## 2024-07-29 DIAGNOSIS — K63.5 POLYP OF COLON, UNSPECIFIED PART OF COLON, UNSPECIFIED TYPE: ICD-10-CM

## 2024-07-29 DIAGNOSIS — I10 ESSENTIAL HYPERTENSION: Chronic | ICD-10-CM

## 2024-07-29 DIAGNOSIS — M1A.09X0 IDIOPATHIC CHRONIC GOUT OF MULTIPLE SITES WITHOUT TOPHUS: Chronic | ICD-10-CM

## 2024-07-29 DIAGNOSIS — I25.10 CORONARY ARTERY DISEASE INVOLVING NATIVE CORONARY ARTERY OF NATIVE HEART WITHOUT ANGINA PECTORIS: ICD-10-CM

## 2024-07-29 DIAGNOSIS — R73.03 PREDIABETES: Chronic | ICD-10-CM

## 2024-07-29 DIAGNOSIS — E78.5 DYSLIPIDEMIA: Chronic | ICD-10-CM

## 2024-07-29 PROBLEM — Z11.3 SCREEN FOR STD (SEXUALLY TRANSMITTED DISEASE): Chronic | Status: RESOLVED | Noted: 2024-05-21 | Resolved: 2024-07-29

## 2024-07-29 PROBLEM — Q24.5 ANOMALOUS RIGHT CORONARY ARTERY: Chronic | Status: ACTIVE | Noted: 2024-04-12

## 2024-07-29 RX ORDER — ATORVASTATIN CALCIUM 20 MG/1
20 TABLET, FILM COATED ORAL DAILY
Qty: 90 TABLET | Refills: 3 | Status: SHIPPED | OUTPATIENT
Start: 2024-07-29

## 2024-08-02 ENCOUNTER — OFFICE VISIT (OUTPATIENT)
Dept: CARDIOLOGY | Facility: MEDICAL CENTER | Age: 59
End: 2024-08-02
Attending: INTERNAL MEDICINE
Payer: COMMERCIAL

## 2024-08-02 VITALS
DIASTOLIC BLOOD PRESSURE: 72 MMHG | WEIGHT: 175 LBS | HEIGHT: 70 IN | BODY MASS INDEX: 25.05 KG/M2 | RESPIRATION RATE: 14 BRPM | OXYGEN SATURATION: 96 % | SYSTOLIC BLOOD PRESSURE: 102 MMHG | HEART RATE: 56 BPM

## 2024-08-02 DIAGNOSIS — I25.10 CORONARY ARTERY DISEASE INVOLVING NATIVE CORONARY ARTERY OF NATIVE HEART WITHOUT ANGINA PECTORIS: Chronic | ICD-10-CM

## 2024-08-02 DIAGNOSIS — I10 ESSENTIAL HYPERTENSION: Chronic | ICD-10-CM

## 2024-08-02 DIAGNOSIS — Q24.5 ANOMALOUS RIGHT CORONARY ARTERY: Chronic | ICD-10-CM

## 2024-08-02 DIAGNOSIS — E78.5 DYSLIPIDEMIA: Chronic | ICD-10-CM

## 2024-08-02 PROCEDURE — 99214 OFFICE O/P EST MOD 30 MIN: CPT | Performed by: INTERNAL MEDICINE

## 2024-08-02 PROCEDURE — 3078F DIAST BP <80 MM HG: CPT | Performed by: INTERNAL MEDICINE

## 2024-08-02 PROCEDURE — 3074F SYST BP LT 130 MM HG: CPT | Performed by: INTERNAL MEDICINE

## 2024-08-02 PROCEDURE — 99212 OFFICE O/P EST SF 10 MIN: CPT | Performed by: INTERNAL MEDICINE

## 2024-08-02 ASSESSMENT — ENCOUNTER SYMPTOMS
MUSCULOSKELETAL NEGATIVE: 1
MYALGIAS: 0
PSYCHIATRIC NEGATIVE: 1
PALPITATIONS: 0
CONSTITUTIONAL NEGATIVE: 1
NEUROLOGICAL NEGATIVE: 1
EYES NEGATIVE: 1
BLURRED VISION: 0
SHORTNESS OF BREATH: 0
CLAUDICATION: 0
GASTROINTESTINAL NEGATIVE: 1
HEADACHES: 0
NERVOUS/ANXIOUS: 0
COUGH: 0
CARDIOVASCULAR NEGATIVE: 1
BRUISES/BLEEDS EASILY: 0
DEPRESSION: 0
WEIGHT LOSS: 0
NAUSEA: 0
RESPIRATORY NEGATIVE: 1
CHILLS: 0
WEAKNESS: 0
ABDOMINAL PAIN: 0
DIZZINESS: 0
VOMITING: 0
DOUBLE VISION: 0
FEVER: 0
FOCAL WEAKNESS: 0

## 2024-08-02 NOTE — PROGRESS NOTES
Chief Complaint   Patient presents with    Coronary Artery Disease     F/V Dx: Coronary artery disease involving native coronary artery without angina pectoris    Hypertension    Dyslipidemia       Subjective     Fuad Hwang is a 59 y.o. male who presents today for follow up of coronary artery disease, hypertension and hyperlipidemia.    Since the patient's last visit on 04/12/24, he has been doing well clinically from cardiac standpoint. He denies fatigue, chest pain, shortness of breath, palpitations, lower extremity edema, dizziness or syncope. He keeps active walking. He has been out riding his [employer] motorcycle.    Past Medical History:   Diagnosis Date    Apnea, sleep     Daytime sleepiness     Hyperlipidemia     Hypertension     Snoring      Past Surgical History:   Procedure Laterality Date    APPENDECTOMY      MYRINGOTOMY      SHOULDER ARTHROSCOPY Left      Family History   Problem Relation Age of Onset    Heart Disease Mother     Diabetes Mother     Stroke Mother     No Known Problems Father     Cancer Maternal Grandmother         Lung, smoking    Heart Disease Maternal Grandmother     Diabetes Maternal Grandmother     Cancer Maternal Grandfather     No Known Problems Paternal Grandmother     Cancer Paternal Grandfather      Social History     Socioeconomic History    Marital status:      Spouse name: Not on file    Number of children: Not on file    Years of education: Not on file    Highest education level: Not on file   Occupational History    Not on file   Tobacco Use    Smoking status: Never    Smokeless tobacco: Never   Vaping Use    Vaping status: Never Used   Substance and Sexual Activity    Alcohol use: Yes     Comment: occ    Drug use: No    Sexual activity: Not Currently     Comment: Monogamous , 2boys, manager    Other Topics Concern    Not on file   Social History Narrative    Not on file     Social Determinants of Health     Financial Resource Strain: Not on  file   Food Insecurity: Not on file   Transportation Needs: Not on file   Physical Activity: Not on file   Stress: Not on file   Social Connections: Not on file   Intimate Partner Violence: Not on file   Housing Stability: Not on file     No Known Allergies    (Medications reviewed.)  Outpatient Encounter Medications as of 8/2/2024   Medication Sig Dispense Refill    atorvastatin (LIPITOR) 20 MG Tab Take 1 Tablet by mouth every day. 90 Tablet 3    amLODIPine (NORVASC) 5 MG Tab TAKE 1 TABLET BY MOUTH EVERY DAY. *APPOINTMENT NEEDED* 90 Tablet 3    allopurinol (ZYLOPRIM) 300 MG Tab TAKE 1 TABLET BY MOUTH EVERY DAY 90 Tablet 3    acetaminophen (TYLENOL) 500 MG Tab Take 500-1,000 mg by mouth every 6 hours as needed.      therapeutic multivitamin-minerals (THERAGRAN-M) Tab Take 1 Tab by mouth every day.      aspirin EC (ECOTRIN) 81 MG Tablet Delayed Response Take 81 mg by mouth every day.      valacyclovir (VALTREX) 1 GM Tab Take 1 Tablet by mouth 2 times a day. (Patient not taking: Reported on 8/2/2024) 14 Tablet 0     No facility-administered encounter medications on file as of 8/2/2024.     Review of Systems   Constitutional: Negative.  Negative for chills, fever, malaise/fatigue and weight loss.   HENT: Negative.  Negative for hearing loss.    Eyes: Negative.  Negative for blurred vision and double vision.   Respiratory: Negative.  Negative for cough and shortness of breath.    Cardiovascular: Negative.  Negative for chest pain, palpitations, claudication and leg swelling.   Gastrointestinal: Negative.  Negative for abdominal pain, nausea and vomiting.   Genitourinary: Negative.  Negative for dysuria and urgency.   Musculoskeletal: Negative.  Negative for joint pain and myalgias.   Skin: Negative.  Negative for itching and rash.   Neurological: Negative.  Negative for dizziness, focal weakness, weakness and headaches.   Endo/Heme/Allergies: Negative.  Does not bruise/bleed easily.   Psychiatric/Behavioral: Negative.   "Negative for depression. The patient is not nervous/anxious.               Objective     /72 (BP Location: Left arm, Patient Position: Sitting, BP Cuff Size: Adult)   Pulse (!) 56   Resp 14   Ht 1.778 m (5' 10\")   Wt 79.4 kg (175 lb)   SpO2 96%   BMI 25.11 kg/m²     Physical Exam  Constitutional:       Appearance: Normal appearance. He is well-developed and normal weight.   HENT:      Head: Normocephalic and atraumatic.   Neck:      Vascular: No JVD.   Cardiovascular:      Rate and Rhythm: Normal rate and regular rhythm.      Heart sounds: Normal heart sounds.   Pulmonary:      Effort: Pulmonary effort is normal.      Breath sounds: Normal breath sounds.   Abdominal:      General: Bowel sounds are normal.      Palpations: Abdomen is soft.      Comments: No hepatosplenomegaly.   Musculoskeletal:         General: Normal range of motion.   Lymphadenopathy:      Cervical: No cervical adenopathy.   Skin:     General: Skin is warm and dry.   Neurological:      Mental Status: He is alert and oriented to person, place, and time.            CARDIAC STUDIES/PROCEDURES:     CTA OF HEART [facility] (2014)  Right coronary artery There is an anomalous origin of the right coronary artery  with an origin in the left coronary cusp. The right coronary artery has an eccentric slit like ostium with an MLA of 11 mm2. There is mild motion artifact in the mid right coronary artery.    ECHOCARDIOGRAM CONCLUSIONS (09/16/21)  No prior study is available for comparison.   Normal left ventricular size, thickness, systolic function, and diastolic function.  Structurally normal aortic valve without stenosis.   Trace aortic insufficiency.  Mild pulmonic insufficiency.    EKG performed on (04/12/24) EKG shows sinus bradycardia with right bundle branch block.      Laboratory results of (07/22/24) were reviewed. Cholesterol profile of 178/73/60/103 mg/dL noted.   Laboratory results of (04/08/24) Cholesterol profile of 186/76/63/108 " mg/dL noted.      MYOCARDIAL PERFUSION STUDY CONCLUSIONS (0426/24)  NUCLEAR IMAGING INTERPRETATION  Normal exercise myocardial perfusion study.  No evidence of ischemia or infarct.  SDS 0.  LVEF 59%.  Sinus rhythm with right bundle branch block at rest.  Negative stress ECG for ischemia.  Good exercise tolerance.  Philip 07:00, 8.9 METs.  Occasional PVCs.  ECG INTERPRETATION  Negative stress ECG for ischemia.  (study result reviewed)     Assessment & Plan     1. Coronary artery disease involving native coronary artery of native heart without angina pectoris        2. Anomalous right coronary artery        3. Essential hypertension        4. Dyslipidemia            Medical Decision Making: Today's Assessment/Status/Plan:        Coronary artery disease (with history  of unusual description on his remote CTA): He is a 59 year old male with coronary artery disease, hypertension and hyperlipidemia, prediabetes. He remains clinically doing well. I will continue with current medical care including atorvastatin. He is still pending CT coronary angiography.  Hypertension: Blood pressure is well controlled. We will continue with amlodipine.  Hyperlipidemia: He is doing well on statin therapy without myalgia symptoms. He was started on statin therapy by his primary care physician.  Additional information: He works as a .     We will follow up in 3 months.    CC Aleksandr Corral

## 2024-08-05 LAB
ELF SCORE: 8.67 - (ref 9.8–11.3)
HA (HYALURONIC ACID): 22.66 NG/ML
PIIINP (AMINO-TERMINAL PROPEPTIDE): 8.23 NG/ML
RELATIVE RISK: NORMAL
RISK GROUP: NORMAL
RISK: 3.3 %
TIMP-1 (TISSUE INHIBITOR OF MMP1): 235.1 NG/ML

## 2024-08-14 ENCOUNTER — TELEPHONE (OUTPATIENT)
Dept: CARDIOLOGY | Facility: MEDICAL CENTER | Age: 59
End: 2024-08-14
Payer: COMMERCIAL

## 2024-08-14 ENCOUNTER — HOSPITAL ENCOUNTER (OUTPATIENT)
Dept: RADIOLOGY | Facility: MEDICAL CENTER | Age: 59
End: 2024-08-14
Attending: INTERNAL MEDICINE
Payer: COMMERCIAL

## 2024-08-14 VITALS — HEART RATE: 52 BPM | DIASTOLIC BLOOD PRESSURE: 57 MMHG | SYSTOLIC BLOOD PRESSURE: 121 MMHG

## 2024-08-14 DIAGNOSIS — Q24.5 ANOMALOUS RIGHT CORONARY ARTERY: ICD-10-CM

## 2024-08-14 DIAGNOSIS — I25.10 CORONARY ARTERY DISEASE INVOLVING NATIVE CORONARY ARTERY OF NATIVE HEART WITHOUT ANGINA PECTORIS: ICD-10-CM

## 2024-08-14 DIAGNOSIS — I10 ESSENTIAL HYPERTENSION: Chronic | ICD-10-CM

## 2024-08-14 PROCEDURE — A9270 NON-COVERED ITEM OR SERVICE: HCPCS | Performed by: INTERNAL MEDICINE

## 2024-08-14 PROCEDURE — A9270 NON-COVERED ITEM OR SERVICE: HCPCS

## 2024-08-14 PROCEDURE — 700117 HCHG RX CONTRAST REV CODE 255: Performed by: INTERNAL MEDICINE

## 2024-08-14 PROCEDURE — 700102 HCHG RX REV CODE 250 W/ 637 OVERRIDE(OP): Performed by: INTERNAL MEDICINE

## 2024-08-14 PROCEDURE — 700102 HCHG RX REV CODE 250 W/ 637 OVERRIDE(OP)

## 2024-08-14 PROCEDURE — 75574 CT ANGIO HRT W/3D IMAGE: CPT

## 2024-08-14 RX ORDER — NITROGLYCERIN 0.4 MG/1
0.4 TABLET SUBLINGUAL ONCE
Status: COMPLETED | OUTPATIENT
Start: 2024-08-14 | End: 2024-08-14

## 2024-08-14 RX ORDER — NITROGLYCERIN 0.4 MG/1
TABLET SUBLINGUAL
Status: COMPLETED
Start: 2024-08-14 | End: 2024-08-14

## 2024-08-14 RX ADMIN — NITROGLYCERIN 0.4 MG: 0.4 TABLET SUBLINGUAL at 07:52

## 2024-08-14 RX ADMIN — IOHEXOL 60 ML: 350 INJECTION, SOLUTION INTRAVENOUS at 07:57

## 2024-08-14 NOTE — PROGRESS NOTES
Patient had CCTA.  Patient brought to preparation room.   Verbal consent given by patient for PIV and administration of Nitroglycerin by RN.  PIV initiated, 20 LAC    Review of medications, protocol, and NPO status completed.   Education provided to patient regarding examination.  Patient given baseline 3 lead EKG:   Vitals: 0727  BP: 124/62  HR: 56  RR: 16   95% RA     Patient ready for CT scan  Vitals: 0746  BP: 133/71  HR: 54  RR: 16  98% RA    Administration of Sublingual Nitroglycerin given per CCTA protocol at 0752--See MAR  Vitals: 0752  BP: 121/57  HR: 52  RR: 16  100% RA    Post Nitro Vitals: 0757  BP: 104/55  HR: 46  RR: 16  99% RA    END Vitals: 0800  BP: 106/55  HR: 54  RR: 16  97% RA    Patient returned to preparation area where post procedure education given. PIV removed, patient provided with water.     Vitals returned to baseline. Patient states they are ready to go home. Discharge education provided. Discharged per protocol.     Patient is ambulatory, escorted by RN to [facility].

## 2024-08-14 NOTE — TELEPHONE ENCOUNTER
Please notify patent of coronary CT angiography which confirms anomalous right coronary artery which dangerously courses between the aorta and the pulmonary artery. Discussed with cardiothoracic surgery. Please schedule cardiac catheterization and refer to CT surgery for consultation as per their recommendations. Thank you.  CHANA

## 2024-08-15 ENCOUNTER — TELEPHONE (OUTPATIENT)
Dept: CARDIOLOGY | Facility: MEDICAL CENTER | Age: 59
End: 2024-08-15
Payer: COMMERCIAL

## 2024-08-15 DIAGNOSIS — R25.2 LEG CRAMPS: ICD-10-CM

## 2024-08-15 LAB
APOB+LDLR+PCSK9 GENE MUT ANL BLD/T: NOT DETECTED
BRCA1+BRCA2 DEL+DUP + FULL MUT ANL BLD/T: NOT DETECTED
MLH1+MSH2+MSH6+PMS2 GN DEL+DUP+FUL M: NOT DETECTED

## 2024-08-15 RX ORDER — ROSUVASTATIN CALCIUM 5 MG/1
5 TABLET, COATED ORAL EVERY EVENING
Qty: 90 TABLET | Refills: 3 | Status: SHIPPED | OUTPATIENT
Start: 2024-08-15

## 2024-08-15 NOTE — TELEPHONE ENCOUNTER
Phone Number Called: [phone]     Call outcome: Did not leave a detailed message. Requested patient to call back.    Message: Called to inform patient of JI recommendations. Unable to reach. Left VM for patient to call back when able. Gave ER precautions on VM.         ----- Message from Nurse Ysabel CUMMINS R.N. sent at 8/14/2024  5:47 PM PDT -----    ----- Message -----  From: Stiven Anders M.D.  Sent: 8/14/2024   4:14 PM PDT  To: Nay Kirkpatrick R.N.    Please notify patent of coronary CT angiography which confirms anomalous right coronary artery which dangerously courses between the aorta and the pulmonary artery. Discussed with cardiothoracic surgery. Please schedule cardiac catheterization and refer to CT surgery for consultation as per their recommendations. Thank you.  LUCIE.

## 2024-08-15 NOTE — TELEPHONE ENCOUNTER
Phone Number Called: pt returned called    Call outcome: Spoke to patient regarding message below.    Message: Called to discuss CT-A findings and referrals/ orders placed. Pt would like to discuss with LUCIE. Pt to be scheduled 8/26 at 3pm. He is agreeable to schedule heart cath and consult with CT surgery.

## 2024-08-16 ENCOUNTER — APPOINTMENT (OUTPATIENT)
Dept: ADMISSIONS | Facility: MEDICAL CENTER | Age: 59
End: 2024-08-16
Attending: INTERNAL MEDICINE
Payer: COMMERCIAL

## 2024-08-16 NOTE — TELEPHONE ENCOUNTER
Patient is scheduled on 9-11-24 for a C w/poss with Dr. Asif. No meds to stop and patient to check in at 5:30 for a 7:30 procedure. H&P will be done on 8-26-24 by . Pre admit to call patient.

## 2024-08-19 ENCOUNTER — HOSPITAL ENCOUNTER (OUTPATIENT)
Dept: LAB | Facility: MEDICAL CENTER | Age: 59
End: 2024-08-19
Attending: INTERNAL MEDICINE
Payer: COMMERCIAL

## 2024-08-19 ENCOUNTER — HOSPITAL ENCOUNTER (OUTPATIENT)
Dept: LAB | Facility: MEDICAL CENTER | Age: 59
End: 2024-08-19
Attending: NURSE PRACTITIONER
Payer: COMMERCIAL

## 2024-08-19 DIAGNOSIS — R73.03 PREDIABETES: Chronic | ICD-10-CM

## 2024-08-19 DIAGNOSIS — Z01.812 PRE-PROCEDURE LAB EXAM: ICD-10-CM

## 2024-08-19 DIAGNOSIS — E78.5 DYSLIPIDEMIA: Chronic | ICD-10-CM

## 2024-08-19 LAB
ALT SERPL-CCNC: 11 U/L (ref 2–50)
ANION GAP SERPL CALC-SCNC: 11 MMOL/L (ref 7–16)
BUN SERPL-MCNC: 22 MG/DL (ref 8–22)
CALCIUM SERPL-MCNC: 9.6 MG/DL (ref 8.5–10.5)
CHLORIDE SERPL-SCNC: 100 MMOL/L (ref 96–112)
CHOLEST SERPL-MCNC: 131 MG/DL (ref 100–199)
CO2 SERPL-SCNC: 27 MMOL/L (ref 20–33)
CREAT SERPL-MCNC: 0.7 MG/DL (ref 0.5–1.4)
EST. AVERAGE GLUCOSE BLD GHB EST-MCNC: 111 MG/DL
FASTING STATUS PATIENT QL REPORTED: NORMAL
GFR SERPLBLD CREATININE-BSD FMLA CKD-EPI: 105 ML/MIN/1.73 M 2
GLUCOSE SERPL-MCNC: 86 MG/DL (ref 65–99)
HBA1C MFR BLD: 5.5 % (ref 4–5.6)
HDLC SERPL-MCNC: 51 MG/DL
LDLC SERPL CALC-MCNC: 68 MG/DL
POTASSIUM SERPL-SCNC: 4.1 MMOL/L (ref 3.6–5.5)
SODIUM SERPL-SCNC: 138 MMOL/L (ref 135–145)
TRIGL SERPL-MCNC: 61 MG/DL (ref 0–149)

## 2024-08-19 PROCEDURE — 80061 LIPID PANEL: CPT

## 2024-08-19 PROCEDURE — 84460 ALANINE AMINO (ALT) (SGPT): CPT

## 2024-08-19 PROCEDURE — 36415 COLL VENOUS BLD VENIPUNCTURE: CPT

## 2024-08-19 PROCEDURE — 80048 BASIC METABOLIC PNL TOTAL CA: CPT

## 2024-08-19 PROCEDURE — 83036 HEMOGLOBIN GLYCOSYLATED A1C: CPT

## 2024-08-22 ENCOUNTER — PRE-ADMISSION TESTING (OUTPATIENT)
Dept: ADMISSIONS | Facility: MEDICAL CENTER | Age: 59
End: 2024-08-22
Attending: INTERNAL MEDICINE
Payer: COMMERCIAL

## 2024-08-22 RX ORDER — MULTIVIT WITH MINERALS/LUTEIN
1 TABLET ORAL DAILY
COMMUNITY

## 2024-08-22 ASSESSMENT — LIFESTYLE VARIABLES
HOW OFTEN DO YOU HAVE A DRINK CONTAINING ALCOHOL: 2-4 TIMES A MONTH
HOW OFTEN DO YOU HAVE SIX OR MORE DRINKS ON ONE OCCASION: NEVER
HOW MANY STANDARD DRINKS CONTAINING ALCOHOL DO YOU HAVE ON A TYPICAL DAY: 1 OR 2
SKIP TO QUESTIONS 9-10: 1
AUDIT-C TOTAL SCORE: 2

## 2024-08-22 NOTE — OR NURSING
Tele pre-admit completed with patient for procedure scheduled on 9/11/24.  Patient was instructed to hold sildenafil 72 hours prior to procedure.  No questions at this time.  Patient scheduled to come in for testing on 9-9-24 at 8:30 am.

## 2024-08-26 ENCOUNTER — OFFICE VISIT (OUTPATIENT)
Dept: CARDIOLOGY | Facility: MEDICAL CENTER | Age: 59
End: 2024-08-26
Attending: INTERNAL MEDICINE
Payer: COMMERCIAL

## 2024-08-26 VITALS
HEART RATE: 56 BPM | WEIGHT: 174.4 LBS | SYSTOLIC BLOOD PRESSURE: 110 MMHG | HEIGHT: 70 IN | RESPIRATION RATE: 16 BRPM | DIASTOLIC BLOOD PRESSURE: 60 MMHG | OXYGEN SATURATION: 94 % | BODY MASS INDEX: 24.97 KG/M2

## 2024-08-26 DIAGNOSIS — I10 ESSENTIAL HYPERTENSION: Chronic | ICD-10-CM

## 2024-08-26 DIAGNOSIS — E78.5 DYSLIPIDEMIA: Chronic | ICD-10-CM

## 2024-08-26 DIAGNOSIS — I25.10 CORONARY ARTERY DISEASE INVOLVING NATIVE CORONARY ARTERY OF NATIVE HEART WITHOUT ANGINA PECTORIS: ICD-10-CM

## 2024-08-26 DIAGNOSIS — Q24.5 ANOMALOUS RIGHT CORONARY ARTERY: ICD-10-CM

## 2024-08-26 PROCEDURE — 99213 OFFICE O/P EST LOW 20 MIN: CPT | Performed by: INTERNAL MEDICINE

## 2024-08-26 ASSESSMENT — ENCOUNTER SYMPTOMS
WEAKNESS: 0
MYALGIAS: 0
NAUSEA: 0
NEUROLOGICAL NEGATIVE: 1
GASTROINTESTINAL NEGATIVE: 1
CLAUDICATION: 0
CARDIOVASCULAR NEGATIVE: 1
ABDOMINAL PAIN: 0
NERVOUS/ANXIOUS: 0
BLURRED VISION: 0
CONSTITUTIONAL NEGATIVE: 1
FOCAL WEAKNESS: 0
HEADACHES: 0
DOUBLE VISION: 0
COUGH: 0
RESPIRATORY NEGATIVE: 1
DEPRESSION: 0
CHILLS: 0
PSYCHIATRIC NEGATIVE: 1
PALPITATIONS: 0
SHORTNESS OF BREATH: 0
BRUISES/BLEEDS EASILY: 0
VOMITING: 0
FEVER: 0
WEIGHT LOSS: 0
DIZZINESS: 0
EYES NEGATIVE: 1
MUSCULOSKELETAL NEGATIVE: 1

## 2024-08-26 NOTE — PROGRESS NOTES
Chief Complaint   Patient presents with    Coronary Artery Disease     F/V Dx: Coronary artery disease involving native coronary artery of native heart without angina pectoris    Hypertension    Dyslipidemia       Subjective     Fuad Hwang is a 59 y.o. male who presents today for follow up of anomalous right coronary artery, hypertension and hyperlipidemia.    Since the patient's last visit on 08/02/24, he has been doing well clinically from cardiac standpoint. He denies fatigue, chest pain, shortness of breath, palpitations, lower extremity edema, dizziness or syncope. He underwent coronary CT angiogram which showed anomalous right coronary artery. The study was reviewed with Mahendra Tomlin who recommended surgical consultation.     Past Medical History:   Diagnosis Date    Apnea, sleep     Does not CPAP or BIPAP    Cancer (HCC) 10/2022    Prostrate removed 2/2023    Daytime sleepiness     Family history of adverse effect to anesthesia     Mom had intraoperative awareness.    Hyperlipidemia     Hypertension     Snoring      Past Surgical History:   Procedure Laterality Date    APPENDECTOMY      MYRINGOTOMY      OTHER  ?, 1996    Cyst removed right neck childhood, Cyst removed right hand    PROSTATECTOMY, RADIAL      Prostate removed 2023    SHOULDER ARTHROSCOPY Left      Family History   Problem Relation Age of Onset    Heart Disease Mother     Diabetes Mother     Stroke Mother     No Known Problems Father     Cancer Maternal Grandmother         Lung, smoking    Heart Disease Maternal Grandmother     Diabetes Maternal Grandmother     Cancer Maternal Grandfather     No Known Problems Paternal Grandmother     Cancer Paternal Grandfather      Social History     Socioeconomic History    Marital status:      Spouse name: Not on file    Number of children: Not on file    Years of education: Not on file    Highest education level: Not on file   Occupational History    Not on file   Tobacco Use     Smoking status: Never    Smokeless tobacco: Never   Vaping Use    Vaping status: Never Used   Substance and Sexual Activity    Alcohol use: Yes     Alcohol/week: 1.8 oz     Types: 1 Cans of beer, 2 Standard drinks or equivalent per week     Comment: Not every week    Drug use: Never    Sexual activity: Not Currently     Comment: Monogamous , 2boys, manager    Other Topics Concern    Not on file   Social History Narrative    Not on file     Social Determinants of Health     Financial Resource Strain: Not on file   Food Insecurity: Not on file   Transportation Needs: Not on file   Physical Activity: Not on file   Stress: Not on file   Social Connections: Not on file   Intimate Partner Violence: Not on file   Housing Stability: Not on file     No Active Allergies    (Medications reviewed.)  Outpatient Encounter Medications as of 8/26/2024   Medication Sig Dispense Refill    ATORVASTATIN CALCIUM PO Take 20 mg by mouth every evening.      Ascorbic Acid (VITAMIN C) 1000 MG Tab Take 1 Tablet by mouth every day.      Multiple Vitamins-Minerals (ZINC PO) Take 1 Tablet by mouth every day.      Cholecalciferol (VITAMIN D3 PO) Take 1 Capsule by mouth every day.      SILDENAFIL CITRATE PO Take 100 mg by mouth as needed (ED).      valacyclovir (VALTREX) 1 GM Tab Take 1 Tablet by mouth 2 times a day. (Patient taking differently: Take 1,000 mg by mouth as needed (Outbreak).) 14 Tablet 0    amLODIPine (NORVASC) 5 MG Tab TAKE 1 TABLET BY MOUTH EVERY DAY. *APPOINTMENT NEEDED* 90 Tablet 3    allopurinol (ZYLOPRIM) 300 MG Tab TAKE 1 TABLET BY MOUTH EVERY DAY 90 Tablet 3    acetaminophen (TYLENOL) 500 MG Tab Take 500-1,000 mg by mouth every 6 hours as needed.      therapeutic multivitamin-minerals (THERAGRAN-M) Tab Take 1 Tab by mouth every day.      aspirin EC (ECOTRIN) 81 MG Tablet Delayed Response Take 81 mg by mouth every day.      rosuvastatin (CRESTOR) 5 MG Tab Take 1 Tablet by mouth every evening. (Patient not  "taking: Reported on 8/22/2024) 90 Tablet 3     No facility-administered encounter medications on file as of 8/26/2024.     Review of Systems   Constitutional: Negative.  Negative for chills, fever, malaise/fatigue and weight loss.   HENT: Negative.  Negative for hearing loss.    Eyes: Negative.  Negative for blurred vision and double vision.   Respiratory: Negative.  Negative for cough and shortness of breath.    Cardiovascular: Negative.  Negative for chest pain, palpitations, claudication and leg swelling.   Gastrointestinal: Negative.  Negative for abdominal pain, nausea and vomiting.   Genitourinary: Negative.  Negative for dysuria and urgency.   Musculoskeletal: Negative.  Negative for joint pain and myalgias.   Skin: Negative.  Negative for itching and rash.   Neurological: Negative.  Negative for dizziness, focal weakness, weakness and headaches.   Endo/Heme/Allergies: Negative.  Does not bruise/bleed easily.   Psychiatric/Behavioral: Negative.  Negative for depression. The patient is not nervous/anxious.               Objective     /60 (BP Location: Left arm, Patient Position: Sitting, BP Cuff Size: Adult)   Pulse (!) 56   Resp 16   Ht 1.778 m (5' 10\")   Wt 79.1 kg (174 lb 6.4 oz)   SpO2 94%   BMI 25.02 kg/m²     Physical Exam  Constitutional:       Appearance: Normal appearance. He is well-developed and normal weight.   HENT:      Head: Normocephalic and atraumatic.   Neck:      Vascular: No JVD.   Cardiovascular:      Rate and Rhythm: Normal rate and regular rhythm.      Heart sounds: Normal heart sounds.   Pulmonary:      Effort: Pulmonary effort is normal.      Breath sounds: Normal breath sounds.   Abdominal:      General: Bowel sounds are normal.      Palpations: Abdomen is soft.      Comments: No hepatosplenomegaly.   Musculoskeletal:         General: Normal range of motion.   Lymphadenopathy:      Cervical: No cervical adenopathy.   Skin:     General: Skin is warm and dry.   Neurological: "      Mental Status: He is alert and oriented to person, place, and time.            CARDIAC STUDIES/PROCEDURES:    CTA OF HEART 3D (08/14/24)    1. Anomalous RCA origin from the left coronary cusp, with interarterial course. This results in 50-69% luminal narrowing of the proximal RCA and may predispose RCA territory ischemia.  2. Mixed plaque in the proximal-mid LAD results in approximately 50% stenosis. Consider functional assessment.  3. Minimal plaque in the RCA, LCx and proximal LAD results in no hemodynamically significant stenosis.  CAD-RADS category: 3. Moderate coronary stenosis, estimated at 50%-69%. Consider functional assessment.  (study result reviewed)    CTA OF HEART [facility] (2014)  Right coronary artery There is an anomalous origin of the right coronary artery  with an origin in the left coronary cusp. The right coronary artery has an eccentric slit like ostium with an MLA of 11 mm2. There is mild motion artifact in the mid right coronary artery.     ECHOCARDIOGRAM CONCLUSIONS (09/16/21)  No prior study is available for comparison.   Normal left ventricular size, thickness, systolic function, and diastolic function.  Structurally normal aortic valve without stenosis.   Trace aortic insufficiency.  Mild pulmonic insufficiency.     EKG performed on (04/12/24) EKG shows sinus bradycardia with right bundle branch block.      Laboratory results of (08/19/24) were reviewed. Cholesterol profile of 131/67/51/68 mg/dL noted.  Laboratory results of (07/22/24) Cholesterol profile of 178/73/60/103 mg/dL noted.   Laboratory results of (04/08/24) Cholesterol profile of 186/76/63/108 mg/dL noted.      MYOCARDIAL PERFUSION STUDY CONCLUSIONS (0426/24)  NUCLEAR IMAGING INTERPRETATION  Normal exercise myocardial perfusion study.  No evidence of ischemia or infarct.  SDS 0.  LVEF 59%.  Sinus rhythm with right bundle branch block at rest.  Negative stress ECG for ischemia.  Good exercise tolerance.  Philip 07:00,  8.9 METs.  Occasional PVCs.  ECG INTERPRETATION  Negative stress ECG for ischemia.    Assessment & Plan     1. Anomalous right coronary artery        2. Coronary artery disease involving native coronary artery of native heart without angina pectoris        3. Essential hypertension        4. Dyslipidemia            Medical Decision Making: Today's Assessment/Status/Plan:        Anomalous right coronary artery: He is a 59 year old male with anomalous right coronary artery, hypertension and hyperlipidemia. His CT angiogram demonstrated anomalous right coronary artery which courses between the aorta and the pulmonary artery. The case was discussed with Mahendra Tomlin. We will perform cardiac catheterization. He understands the risks and benefits and agrees with plan. He has an appointment with May Manzano.  Hypertension: Blood pressure is well controlled. We will continue with amlodipine.  Hyperlipidemia: He is doing well on statin therapy without myalgia symptoms.  Additional information: He works as a . He enjoys riding his [employer] motorcycle.     We will follow up in 3 months.    CC Aleksandr Corral

## 2024-09-09 ENCOUNTER — PRE-ADMISSION TESTING (OUTPATIENT)
Dept: ADMISSIONS | Facility: MEDICAL CENTER | Age: 59
End: 2024-09-09
Attending: INTERNAL MEDICINE
Payer: COMMERCIAL

## 2024-09-09 DIAGNOSIS — Z01.810 PRE-OPERATIVE CARDIOVASCULAR EXAMINATION: ICD-10-CM

## 2024-09-09 DIAGNOSIS — Z01.812 PRE-OPERATIVE LABORATORY EXAMINATION: ICD-10-CM

## 2024-09-09 LAB
ALBUMIN SERPL BCP-MCNC: 4.4 G/DL (ref 3.2–4.9)
ALBUMIN/GLOB SERPL: 1.5 G/DL
ALP SERPL-CCNC: 81 U/L (ref 30–99)
ALT SERPL-CCNC: 13 U/L (ref 2–50)
ANION GAP SERPL CALC-SCNC: 9 MMOL/L (ref 7–16)
APTT PPP: 30.8 SEC (ref 24.7–36)
AST SERPL-CCNC: 13 U/L (ref 12–45)
BILIRUB SERPL-MCNC: 0.6 MG/DL (ref 0.1–1.5)
BUN SERPL-MCNC: 20 MG/DL (ref 8–22)
CALCIUM ALBUM COR SERPL-MCNC: 8.9 MG/DL (ref 8.5–10.5)
CALCIUM SERPL-MCNC: 9.2 MG/DL (ref 8.5–10.5)
CHLORIDE SERPL-SCNC: 102 MMOL/L (ref 96–112)
CO2 SERPL-SCNC: 29 MMOL/L (ref 20–33)
CREAT SERPL-MCNC: 0.81 MG/DL (ref 0.5–1.4)
EKG IMPRESSION: NORMAL
ERYTHROCYTE [DISTWIDTH] IN BLOOD BY AUTOMATED COUNT: 51.5 FL (ref 35.9–50)
GFR SERPLBLD CREATININE-BSD FMLA CKD-EPI: 100 ML/MIN/1.73 M 2
GLOBULIN SER CALC-MCNC: 3 G/DL (ref 1.9–3.5)
GLUCOSE SERPL-MCNC: 110 MG/DL (ref 65–99)
HCT VFR BLD AUTO: 42.4 % (ref 42–52)
HGB BLD-MCNC: 14.3 G/DL (ref 14–18)
INR PPP: 1.01 (ref 0.87–1.13)
MCH RBC QN AUTO: 33.2 PG (ref 27–33)
MCHC RBC AUTO-ENTMCNC: 33.7 G/DL (ref 32.3–36.5)
MCV RBC AUTO: 98.4 FL (ref 81.4–97.8)
PLATELET # BLD AUTO: 214 K/UL (ref 164–446)
PMV BLD AUTO: 9.5 FL (ref 9–12.9)
POTASSIUM SERPL-SCNC: 4.3 MMOL/L (ref 3.6–5.5)
PROT SERPL-MCNC: 7.4 G/DL (ref 6–8.2)
PROTHROMBIN TIME: 13.4 SEC (ref 12–14.6)
RBC # BLD AUTO: 4.31 M/UL (ref 4.7–6.1)
SODIUM SERPL-SCNC: 140 MMOL/L (ref 135–145)
WBC # BLD AUTO: 7.1 K/UL (ref 4.8–10.8)

## 2024-09-09 PROCEDURE — 93005 ELECTROCARDIOGRAM TRACING: CPT

## 2024-09-09 PROCEDURE — 80053 COMPREHEN METABOLIC PANEL: CPT

## 2024-09-09 PROCEDURE — 85730 THROMBOPLASTIN TIME PARTIAL: CPT

## 2024-09-09 PROCEDURE — 85610 PROTHROMBIN TIME: CPT

## 2024-09-09 PROCEDURE — 36415 COLL VENOUS BLD VENIPUNCTURE: CPT

## 2024-09-09 PROCEDURE — 85027 COMPLETE CBC AUTOMATED: CPT

## 2024-09-09 PROCEDURE — 93010 ELECTROCARDIOGRAM REPORT: CPT | Performed by: INTERNAL MEDICINE

## 2024-09-11 ENCOUNTER — APPOINTMENT (OUTPATIENT)
Dept: CARDIOLOGY | Facility: MEDICAL CENTER | Age: 59
End: 2024-09-11
Attending: INTERNAL MEDICINE
Payer: COMMERCIAL

## 2024-09-11 ENCOUNTER — HOSPITAL ENCOUNTER (OUTPATIENT)
Facility: MEDICAL CENTER | Age: 59
End: 2024-09-11
Attending: INTERNAL MEDICINE | Admitting: INTERNAL MEDICINE
Payer: COMMERCIAL

## 2024-09-11 VITALS
OXYGEN SATURATION: 97 % | SYSTOLIC BLOOD PRESSURE: 102 MMHG | HEIGHT: 70 IN | TEMPERATURE: 97.1 F | BODY MASS INDEX: 23.58 KG/M2 | RESPIRATION RATE: 16 BRPM | DIASTOLIC BLOOD PRESSURE: 60 MMHG | HEART RATE: 47 BPM | WEIGHT: 164.68 LBS

## 2024-09-11 DIAGNOSIS — Q24.5 ANOMALOUS RIGHT CORONARY ARTERY: ICD-10-CM

## 2024-09-11 PROCEDURE — C1887 CATHETER, GUIDING: HCPCS

## 2024-09-11 PROCEDURE — 700102 HCHG RX REV CODE 250 W/ 637 OVERRIDE(OP)

## 2024-09-11 PROCEDURE — 160046 HCHG PACU - 1ST 60 MINS PHASE II

## 2024-09-11 PROCEDURE — 160036 HCHG PACU - EA ADDL 30 MINS PHASE I

## 2024-09-11 PROCEDURE — 700101 HCHG RX REV CODE 250

## 2024-09-11 PROCEDURE — 700117 HCHG RX CONTRAST REV CODE 255: Performed by: INTERNAL MEDICINE

## 2024-09-11 PROCEDURE — A9270 NON-COVERED ITEM OR SERVICE: HCPCS

## 2024-09-11 PROCEDURE — 93458 L HRT ARTERY/VENTRICLE ANGIO: CPT | Mod: 26,LM | Performed by: INTERNAL MEDICINE

## 2024-09-11 PROCEDURE — 160035 HCHG PACU - 1ST 60 MINS PHASE I

## 2024-09-11 PROCEDURE — 99152 MOD SED SAME PHYS/QHP 5/>YRS: CPT | Performed by: INTERNAL MEDICINE

## 2024-09-11 PROCEDURE — 160047 HCHG PACU  - EA ADDL 30 MINS PHASE II

## 2024-09-11 PROCEDURE — 160002 HCHG RECOVERY MINUTES (STAT)

## 2024-09-11 PROCEDURE — 700111 HCHG RX REV CODE 636 W/ 250 OVERRIDE (IP)

## 2024-09-11 RX ORDER — HEPARIN SODIUM 1000 [USP'U]/ML
INJECTION, SOLUTION INTRAVENOUS; SUBCUTANEOUS
Status: COMPLETED
Start: 2024-09-11 | End: 2024-09-11

## 2024-09-11 RX ORDER — MIDAZOLAM HYDROCHLORIDE 1 MG/ML
INJECTION INTRAMUSCULAR; INTRAVENOUS
Status: COMPLETED
Start: 2024-09-11 | End: 2024-09-11

## 2024-09-11 RX ORDER — VERAPAMIL HYDROCHLORIDE 2.5 MG/ML
INJECTION, SOLUTION INTRAVENOUS
Status: COMPLETED
Start: 2024-09-11 | End: 2024-09-11

## 2024-09-11 RX ORDER — HEPARIN SODIUM 200 [USP'U]/100ML
INJECTION, SOLUTION INTRAVENOUS
Status: COMPLETED
Start: 2024-09-11 | End: 2024-09-11

## 2024-09-11 RX ORDER — LIDOCAINE HYDROCHLORIDE 20 MG/ML
INJECTION, SOLUTION INFILTRATION; PERINEURAL
Status: COMPLETED
Start: 2024-09-11 | End: 2024-09-11

## 2024-09-11 RX ORDER — ASPIRIN 81 MG/1
TABLET, CHEWABLE ORAL
Status: COMPLETED
Start: 2024-09-11 | End: 2024-09-11

## 2024-09-11 RX ADMIN — ASPIRIN 324 MG: 81 TABLET, CHEWABLE ORAL at 08:10

## 2024-09-11 RX ADMIN — IOHEXOL 26 ML: 350 INJECTION, SOLUTION INTRAVENOUS at 08:46

## 2024-09-11 RX ADMIN — LIDOCAINE HYDROCHLORIDE: 20 INJECTION, SOLUTION INFILTRATION; PERINEURAL at 08:28

## 2024-09-11 RX ADMIN — NITROGLYCERIN 10 ML: 20 INJECTION INTRAVENOUS at 08:28

## 2024-09-11 RX ADMIN — HEPARIN SODIUM: 1000 INJECTION, SOLUTION INTRAVENOUS; SUBCUTANEOUS at 08:29

## 2024-09-11 RX ADMIN — FENTANYL CITRATE 75 MCG: 50 INJECTION, SOLUTION INTRAMUSCULAR; INTRAVENOUS at 08:50

## 2024-09-11 RX ADMIN — MIDAZOLAM HYDROCHLORIDE 2 MG: 1 INJECTION, SOLUTION INTRAMUSCULAR; INTRAVENOUS at 08:31

## 2024-09-11 RX ADMIN — HEPARIN SODIUM 2000 UNITS: 200 INJECTION, SOLUTION INTRAVENOUS at 08:29

## 2024-09-11 RX ADMIN — VERAPAMIL HYDROCHLORIDE 2.5 MG: 2.5 INJECTION, SOLUTION INTRAVENOUS at 08:28

## 2024-09-11 ASSESSMENT — FIBROSIS 4 INDEX: FIB4 SCORE: 1.03

## 2024-09-11 NOTE — PROGRESS NOTES
REFERRING PHYSICIAN: Stiven Anders MD.     CONSULTING PHYSICIAN: May Mahmood MD     CHIEF COMPLAINT: Abnormal imaging    HISTORY OF PRESENT ILLNESS: The patient is a 59 y.o. male with a past medical history of CLAIRE on CPAP, Gout, hypertension, prediabetes, hyperlipidemia, prostate Ca 2023, CAD, anomalous RCA who presents to the office to discuss his recent abnormal imaging.  He has some mild exertional shortness of breath.  He denies chest pain, fatigue, lower extremity edema, syncope.  He was seen previously by his cardiologist and underwent a Cardiac CT which showed an anomalous right coronary artery.  His most recent angiogram shows minimal coronary artery disease without stenosis.  He was originally diagnosed with the anomalous RCA 10 years ago; workup done in [place] at that point including second opinion at [facility] recommendation was to avoid surgery and continue monitoring. He is accompanied by his family member Tona via teleconference for the entirety of this consultation.      PAST MEDICAL HISTORY:   Active Ambulatory Problems     Diagnosis Date Noted    CLAIRE on CPAP 03/28/2019    Gout 03/28/2019    Essential hypertension 07/25/2022    Prediabetes 08/11/2022    Prostate cancer (HCC) 12/15/2022    Dyslipidemia 07/31/2023    Other male erectile dysfunction 07/31/2023    Colon polyp 01/29/2024    Congenital heart disease 03/07/2024    Coronary artery disease involving native coronary artery without angina pectoris 04/12/2024    Anomalous right coronary artery 04/12/2024    Herpes simplex 06/04/2024    Leg cramps 08/15/2024     Resolved Ambulatory Problems     Diagnosis Date Noted    PSA elevation 08/11/2022    Screen for STD (sexually transmitted disease) 05/21/2024     Past Medical History:   Diagnosis Date    Apnea, sleep     CAD (coronary artery disease)     Cancer (HCC) 10/2022    Daytime sleepiness     Family history of adverse effect to anesthesia     Hyperlipidemia     Hypertension     Snoring         PAST SURGICAL HISTORY:   Past Surgical History:   Procedure Laterality Date    APPENDECTOMY      MYRINGOTOMY      OTHER  ?, 1996    Cyst removed right neck childhood, Cyst removed right hand    PROSTATECTOMY, RADIAL      Prostate removed 2023    SHOULDER ARTHROSCOPY Left         ALLERGIES: No Active Allergies     CURRENT MEDICATIONS:   Current Outpatient Medications:     ATORVASTATIN CALCIUM PO, Take 20 mg by mouth every evening., Disp: , Rfl:     Ascorbic Acid (VITAMIN C) 1000 MG Tab, Take 1 Tablet by mouth every day., Disp: , Rfl:     Multiple Vitamins-Minerals (ZINC PO), Take 1 Tablet by mouth every day., Disp: , Rfl:     Cholecalciferol (VITAMIN D3 PO), Take 1 Capsule by mouth every day., Disp: , Rfl:     SILDENAFIL CITRATE PO, Take 100 mg by mouth as needed (ED)., Disp: , Rfl:     rosuvastatin (CRESTOR) 5 MG Tab, Take 1 Tablet by mouth every evening. (Patient not taking: Reported on 8/22/2024), Disp: 90 Tablet, Rfl: 3    valacyclovir (VALTREX) 1 GM Tab, Take 1 Tablet by mouth 2 times a day. (Patient taking differently: Take 1,000 mg by mouth as needed (Outbreak).), Disp: 14 Tablet, Rfl: 0    amLODIPine (NORVASC) 5 MG Tab, TAKE 1 TABLET BY MOUTH EVERY DAY. *APPOINTMENT NEEDED*, Disp: 90 Tablet, Rfl: 3    allopurinol (ZYLOPRIM) 300 MG Tab, TAKE 1 TABLET BY MOUTH EVERY DAY, Disp: 90 Tablet, Rfl: 3    acetaminophen (TYLENOL) 500 MG Tab, Take 500-1,000 mg by mouth every 6 hours as needed., Disp: , Rfl:     therapeutic multivitamin-minerals (THERAGRAN-M) Tab, Take 1 Tab by mouth every day., Disp: , Rfl:     aspirin EC (ECOTRIN) 81 MG Tablet Delayed Response, Take 81 mg by mouth every day., Disp: , Rfl:     FAMILY HISTORY:   Family History   Problem Relation Age of Onset    Heart Disease Mother     Diabetes Mother     Stroke Mother     No Known Problems Father     Cancer Maternal Grandmother         Lung, smoking    Heart Disease Maternal Grandmother     Diabetes Maternal Grandmother     Cancer Maternal  Grandfather     No Known Problems Paternal Grandmother     Cancer Paternal Grandfather         SOCIAL HISTORY:   Social History     Socioeconomic History    Marital status:      Spouse name: Not on file    Number of children: Not on file    Years of education: Not on file    Highest education level: Not on file   Occupational History    Not on file   Tobacco Use    Smoking status: Never    Smokeless tobacco: Never   Vaping Use    Vaping status: Never Used   Substance and Sexual Activity    Alcohol use: Yes     Alcohol/week: 1.8 oz     Types: 1 Cans of beer, 2 Standard drinks or equivalent per week     Comment: Not every week    Drug use: Never    Sexual activity: Not Currently     Comment: Monogamous , 2boys, manager    Other Topics Concern    Not on file   Social History Narrative    Not on file     Social Determinants of Health     Financial Resource Strain: Not on file   Food Insecurity: Not on file   Transportation Needs: Not on file   Physical Activity: Not on file   Stress: Not on file   Social Connections: Not on file   Intimate Partner Violence: Not on file   Housing Stability: Not on file       REVIEW OF SYSTEMS:  Review of Systems   Constitutional:  Negative for chills, fever, malaise/fatigue and weight loss.   HENT:  Negative for ear pain, nosebleeds and tinnitus.    Eyes:  Negative for double vision, photophobia and pain.   Respiratory:  Negative for cough, hemoptysis and shortness of breath.    Cardiovascular:  Negative for chest pain, palpitations, orthopnea, leg swelling and PND.   Gastrointestinal:  Negative for abdominal pain, blood in stool, nausea and vomiting.   Genitourinary:  Negative for frequency, hematuria and urgency.   Musculoskeletal: Negative.    Skin:  Negative for rash.   Neurological:  Negative for dizziness, tremors, speech change, focal weakness, seizures and headaches.   Endo/Heme/Allergies:  Negative for polydipsia. Does not bruise/bleed easily.    Psychiatric/Behavioral:  Negative for hallucinations and memory loss.      PHYSICAL EXAMINATION:    There were no vitals taken for this visit.   Physical Exam  Vitals reviewed.   Constitutional:       General: He is not in acute distress.     Appearance: Normal appearance.      Comments: Well appearing gentleman   HENT:      Head: Normocephalic and atraumatic.      Right Ear: External ear normal.      Left Ear: External ear normal.      Nose: Nose normal. No congestion.   Eyes:      General: No scleral icterus.     Extraocular Movements: Extraocular movements intact.      Conjunctiva/sclera: Conjunctivae normal.   Cardiovascular:      Rate and Rhythm: Normal rate and regular rhythm.   Pulmonary:      Effort: Pulmonary effort is normal. No respiratory distress.   Abdominal:      General: There is no distension.      Palpations: Abdomen is soft.   Musculoskeletal:         General: Normal range of motion.      Cervical back: Normal range of motion.   Skin:     General: Skin is warm and dry.      Coloration: Skin is not jaundiced.      Findings: No rash.   Neurological:      Mental Status: He is alert and oriented to person, place, and time.      Cranial Nerves: No cranial nerve deficit.   Psychiatric:         Mood and Affect: Mood normal.         Behavior: Behavior normal.       LABS REVIEWED:  Lab Results   Component Value Date/Time    SODIUM 140 09/09/2024 07:57 AM    POTASSIUM 4.3 09/09/2024 07:57 AM    CHLORIDE 102 09/09/2024 07:57 AM    CO2 29 09/09/2024 07:57 AM    GLUCOSE 110 (H) 09/09/2024 07:57 AM    BUN 20 09/09/2024 07:57 AM    CREATININE 0.81 09/09/2024 07:57 AM      Lab Results   Component Value Date/Time    PROTHROMBTM 13.4 09/09/2024 07:57 AM    INR 1.01 09/09/2024 07:57 AM      Lab Results   Component Value Date/Time    WBC 7.1 09/09/2024 07:57 AM    RBC 4.31 (L) 09/09/2024 07:57 AM    HEMOGLOBIN 14.3 09/09/2024 07:57 AM    HEMATOCRIT 42.4 09/09/2024 07:57 AM    MCV 98.4 (H) 09/09/2024 07:57 AM    MCH  33.2 (H) 09/09/2024 07:57 AM    MCHC 33.7 09/09/2024 07:57 AM    MPV 9.5 09/09/2024 07:57 AM    NEUTSPOLYS 67.60 09/10/2021 06:14 AM    LYMPHOCYTES 19.40 (L) 09/10/2021 06:14 AM    MONOCYTES 8.00 09/10/2021 06:14 AM    EOSINOPHILS 4.00 09/10/2021 06:14 AM    BASOPHILS 0.60 09/10/2021 06:14 AM        IMAGING REVIEWED AND INTERPRETED:    ECHOCARDIOGRAM 9/16/2021  No prior study is available for comparison.   Normal left ventricular size, thickness, systolic function, and   diastolic function.  Structurally normal aortic valve without stenosis. Trace aortic   insufficiency.  Mild pulmonic insufficiency.    NM Stress Tests [facility] 4/26/24   Normal exercise myocardial perfusion study.   No evidence of ischemia or infarct.   SDS 0.   LVEF 59%.   Sinus rhythm with right bundle branch block at rest.   Negative stress ECG for ischemia.   Good exercise tolerance.   Philip 07:00, 8.9 METs.   Occasional PVCs.   ECG INTERPRETATION   Negative stress ECG for ischemia.          CARDIAC CATHETERIZATION 9/11/24 [facility]  1.  Left main coronary artery:  Normal.  2.  Left anterior descending artery: Luminal irregularities, no significant disease.  3.  Left circumflex coronary artery:  Normal. Gives 1 large marginal branch.  4.  Right coronary artery: Anomalous origin from the left cusp, no significant stenosis.  Luminal irregularities in midportion.  5.  Left ventricular end diastolic pressure:  11 mmHg.  No signficant gradient across the aortic valve.    CTA Heart SCAN CHEST 8/14/24 [facility]  1. Anomalous RCA origin from the left coronary cusp, with interarterial course. This results in 50-69% luminal narrowing of the proximal RCA and may predispose RCA territory ischemia.     2. Mixed plaque in the proximal-mid LAD results in approximately 50% stenosis. Consider functional assessment.     3. Minimal plaque in the RCA, LCx and proximal LAD results in no hemodynamically significant stenosis.        CAD-RADS category: 3. Moderate coronary stenosis,  estimated at 50%-69%. Consider functional assessment.      IMPRESSION:  58 yo male with a past medical history of CLAIRE on CPAP, Gout, hypertension, prediabetes, hyperlipidemia, prostate Ca 2023, CAD now with recent cardiac CT showing anomalous RCA without significant coronary stenosis      PLAN:  I recommend against intervention at this point for anomalous RCA given lack of symptoms and normal stress test. I explained to the patient that the anomalous vessel may be more susceptible to ischemia at lower burden of CAD but currently there doesn't seem to be issues. I advise to have aggressive medical management and preventative care which patient currently practices.  Patient denies any questions.      Findings and recommendations will be communicated to the patient’s cardiologist, Dr. Stiven Anders.  Thank you for this very challenging consultation and participation in the patient’s care.  I will keep you apprised of all future developments.             Sincerely,     May Mahmood MD

## 2024-09-11 NOTE — DISCHARGE INSTRUCTIONS
POST ANGIOGRAM  General Care Instructions  Maintain a bandage over the incision site for 24 hours.  It's normal to find a small bruise or dime-sized lump at the insertion site. This should disappear within a few weeks.  Do not apply lotions or powders to the site.  Do not immerse the catheter insertion site in water (bathtub/swimming) for five days. It is ok to shower 24 hours after the procedure.  You may resume your normal diet immediately; on the day of your procedure, drink 6-10 glasses of water to help flush the contrast liquid out of your system.  If the doctor inserted the catheter in your arm:  For 3 days, DO NOT lift anything heavier than 10 pounds (approximately a gallon of milk). DO NOT do any heavy pushing, pulling, or twisting.    Medications  If your current medications need to be changed, you will be provided with an updated list of your medications prior to discharge.  If you take warfarin (Coumadin), resume taking your usual dose the evening after the procedure.  DO NOT STOP taking prescribed blood thinning (anti-platelet) medications unless instructed by your cardiologist.  These medications include:  Aspirin, Clopidogrel (Plavix), Ticagrelor (Brilinta), or Prasugrel (Effient)   If you take one of the following anticoagulants, RESUME 24 HOURS after your procedure:  Apixiban (Eliquis), Rivaroxaban (Xarelto), Dabigatran (Pradaxa), Edoxaban (Savaysa)  If you take metformin (Glucophage), RESUME 48 HOURS after your procedure.    When to call your healthcare provider  Call your cardiologist right away at [phone] if you have any of the following:   Problems/Concerns taking any of your prescribed heart medicines.   The insertion site has increasing pain, swelling, redness, bleeding, or drainage.   Your arm or leg below where the insertion site changes color, is cool, or is numb.   You have chest pain or shortness of breath that does not go away with rest.   Your pulse feels irregular -- very slow  (less than 60 beats/minute) or very fast (over 100 beats/minute).   You have dizziness, fainting, or you are very tired.   You are coughing up blood or yellow or green mucus.   You have chills or a fever over 101°F (38.3°C).    If there is bleeding at the catheter insertion site, apply pressure for 10 minutes.  If bleeding persists, call 911, and continue to hold pressure until advanced medical support arrives.        Exercising Safely After Percutaneous Coronary Intervention (PCI)  After percutaneous coronary intervention (PCI), which involves angioplasty and often stenting, it's important to focus on your heart health. Exercise can help strengthen your heart. It can also help you feel good and improve your overall health. Talk with your health care provider or cardiac rehab team member about good options for you.  Start slowly. Work up to more vigorous exercise as you get stronger. Aim for at least 150 minutes of exercise each week.  Include aerobic activities. These make the heart beat faster. They work the heart and lungs, and improve the body's ability to use oxygen. Good choices include walking, swimming, and biking .  Always follow your doctor's recommendation for exercise.   You have been referred to cardiac rehabilitation, which is important for your recovery.  You may contact [facility] at [phone] to learn more and schedule a visit.        Lifestyle Management After Percutaneous Coronary Intervention (PCI)  Percutaneous coronary intervention (PCI)  involves angioplasty and often stenting. This procedure can open arteries and relieve symptoms. But, it doesn't cure coronary artery disease. New blockages can still form. You need to take steps to prevent this by managing risk factors. Doing so will help make your heart and arteries healthier. Your doctor may prescribe cardiac rehabilitation to help with this lifelong process.  Understanding risk factors  Some risk factors for  coronary artery disease can be controlled. These include smoking, high blood pressure, cholesterol, diabetes, and obesity. They can be managed with medication, diet, and exercise. Support and counseling can also play a role. The effort will pay off! Managing risk factors can help you be more active, feel better, and reduce the risk of heart attack.    If you smoke, quit!  If your doctor has been urging you to quit smoking, it's for good reasons. Smoking damages your heart, blood vessels, and lungs. The good news is that quitting can halt or even reverse the damage of smoking. To quit now:  Get medical help. Ask your doctor for advice on stop-smoking programs. Also ask about medications or nicotine replacement therapy products that may help you quit smoking.  Get support. Join a support group. Ask for help from your family and friends.  Don't give up. It often takes several tries to succeed in quitting smoking.  Avoid secondhand smoke. Ask family and friends not to smoke around you.

## 2024-09-11 NOTE — PROCEDURES
Cardiac Catheterization report    9/11/2024  11:05 AM    Referring MD:     Indication for procedure: Anomalous right coronary artery    Procedures performed:   Coronary arteriograms  Left heart catheterization     Final impression:  Non-obstructive coronary artery disease    Recommendations:  Guideline directed medical therapy   Cardiovascular Risk factor modification    Findings:  1.  Left main coronary artery:  Normal.  2.  Left anterior descending artery: Luminal irregularities, no significant disease.  3.  Left circumflex coronary artery:  Normal. Gives 1 large marginal branch.  4.  Right coronary artery: Anomalous origin from the left cusp, no significant stenosis.  Luminal irregularities in midportion.  5.  Left ventricular end diastolic pressure:  11 mmHg.  No signficant gradient across the aortic valve.    EBL: <10 CC    Specimens: None    Procedure details:  The risks and benefits of cardiac catheterization and possible intervention were explained to the patient including death, heart attack, stroke, and emergency surgery.  The patient verbalized understanding and wished to proceed.  The patient was brought to the cardiac catheterization laboratory in the fasting state and prepped and draped in the usual sterile fashion.  Timeout was performed.  The right wrist was locally anesthetized with lidocaine and the right radial artery was cannulated with 5/6-Korean equipment and standard radial cocktail was given.  Coronary angiography was performed using JR 4 and JL 3.5  diagnostic catheters in the usual fashion, results mentioned below.  JR 4 catheter was used to cross the aortic valve to perform  left heart catheterization.    Once all the views were obtained, all wires and catheters were removed from the patient without difficulty.  A Vasc-Band was placed over the right radial artery and the radial artery sheath was removed without difficulty.      Complications:  None    Contrast: 26 cc    Sedation  time:  I supervised moderate sedation over a trained independent nursing staff,  Sedation Start time:  08:21   Sedation Stop time: 08:46      DEDRICK Hunt  [facility]       Breathing – normal variations in rate and rhythm, unlabored; grunting absent or intermittent and improving; intercostal, supracostal and subcostal muscles with normal excursion and not retracting; breath sounds are clear or mildly bronchovesicular, symmetric, with adequate intensity and without rales.

## 2024-09-11 NOTE — PROGRESS NOTES
0856-- Report received. Patient arrived to PACU. Attached to monitors. Verified parameters and alarms. TR band to right radial. TR band has 15 cc remaining per report. Right radial pulse 1+. Site is CDI. Patient denies pain, numbness, and tingling. Patient tolerated PO fluids     0903- Belongings returned to patient. Update provided to patient's friend Rashid. All questions answered     1000- 3 cc removed from TR band. No oozing, bruising, or firmness observed. Site is CDI. Right radial pulse 1+. Patient denies pain     1020- 3 cc removed from TR band. No oozing, bruising, or firmness observed. Site is CDI. Right radial pulse 1+. Patient denies pain, numbness, and tingling.     1040- 3 cc removed from TR band. No oozing, bruising, or firmness observed. Site is CDI. Right radial pulse 1+. Patient denies pain, numbness, and tingling.     1055- 3 cc removed from TR band. No oozing, bruising, or firmness observed. Site is CDI. Right radial pulse 1+. Patient denies pain, numbness, and tingling.     1115- 3 cc removed from TR band. No air remaining, TR band removed. Gauze and Tegaderm applied. No oozing, bruising, or firmness observed. Site is CDI. Right radial pulse 2+. Patient denies pain, numbness, and tingling.     1135- Right radial site is CDI and soft. Discharge instructions provided to pt and relative. Discussed follow up appointments, diet, medications and activity. Pt states understanding. IV was removed. Copy of discharge provided to the patient. A signed copy of discharge is in patient's chart. All personal belongings are in patients possession. All questions have been answered.     1148- Patient wheeled off floor by RN.

## 2024-09-17 ENCOUNTER — OFFICE VISIT (OUTPATIENT)
Dept: CARDIOTHORACIC SURGERY | Facility: MEDICAL CENTER | Age: 59
End: 2024-09-17
Payer: COMMERCIAL

## 2024-09-17 VITALS
OXYGEN SATURATION: 98 % | SYSTOLIC BLOOD PRESSURE: 104 MMHG | HEART RATE: 51 BPM | DIASTOLIC BLOOD PRESSURE: 60 MMHG | WEIGHT: 171.6 LBS | BODY MASS INDEX: 24.57 KG/M2 | TEMPERATURE: 97 F | HEIGHT: 70 IN

## 2024-09-17 DIAGNOSIS — Q24.5 ANOMALOUS RIGHT CORONARY ARTERY: ICD-10-CM

## 2024-09-17 PROCEDURE — 3078F DIAST BP <80 MM HG: CPT | Performed by: THORACIC SURGERY (CARDIOTHORACIC VASCULAR SURGERY)

## 2024-09-17 PROCEDURE — 99204 OFFICE O/P NEW MOD 45 MIN: CPT | Performed by: THORACIC SURGERY (CARDIOTHORACIC VASCULAR SURGERY)

## 2024-09-17 PROCEDURE — 3074F SYST BP LT 130 MM HG: CPT | Performed by: THORACIC SURGERY (CARDIOTHORACIC VASCULAR SURGERY)

## 2024-09-17 RX ORDER — SILDENAFIL 100 MG/1
100 TABLET, FILM COATED ORAL
COMMUNITY
Start: 2024-08-25

## 2024-09-17 ASSESSMENT — ENCOUNTER SYMPTOMS
VOMITING: 0
ORTHOPNEA: 0
BLOOD IN STOOL: 0
WEIGHT LOSS: 0
HALLUCINATIONS: 0
SHORTNESS OF BREATH: 0
POLYDIPSIA: 0
MEMORY LOSS: 0
DOUBLE VISION: 0
HEADACHES: 0
MUSCULOSKELETAL NEGATIVE: 1
PND: 0
SPEECH CHANGE: 0
NAUSEA: 0
PHOTOPHOBIA: 0
COUGH: 0
FEVER: 0
DIZZINESS: 0
FOCAL WEAKNESS: 0
SEIZURES: 0
ABDOMINAL PAIN: 0
HEMOPTYSIS: 0
PALPITATIONS: 0
TREMORS: 0
CHILLS: 0
BRUISES/BLEEDS EASILY: 0
EYE PAIN: 0

## 2024-09-17 ASSESSMENT — FIBROSIS 4 INDEX: FIB4 SCORE: 1.03

## 2024-09-23 DIAGNOSIS — M1A.09X0 IDIOPATHIC CHRONIC GOUT OF MULTIPLE SITES WITHOUT TOPHUS: ICD-10-CM

## 2024-09-23 DIAGNOSIS — Z00.00 WELL ADULT EXAM: ICD-10-CM

## 2024-09-23 RX ORDER — ALLOPURINOL 300 MG/1
300 TABLET ORAL DAILY
Qty: 90 TABLET | Refills: 3 | Status: SHIPPED | OUTPATIENT
Start: 2024-09-23

## 2024-09-23 RX ORDER — AMLODIPINE BESYLATE 5 MG/1
TABLET ORAL
Qty: 90 TABLET | Refills: 3 | Status: SHIPPED | OUTPATIENT
Start: 2024-09-23

## 2024-09-23 NOTE — TELEPHONE ENCOUNTER
Received request via: Pharmacy    Was the patient seen in the last year in this department? Yes    Does the patient have an active prescription (recently filled or refills available) for medication(s) requested? No        Does the patient have USP Plus and need 100-day supply? (This applies to ALL medications) Patient does not have SCP

## 2024-09-27 ENCOUNTER — OFFICE VISIT (OUTPATIENT)
Dept: SLEEP MEDICINE | Facility: MEDICAL CENTER | Age: 59
End: 2024-09-27
Attending: NURSE PRACTITIONER
Payer: COMMERCIAL

## 2024-09-27 VITALS
BODY MASS INDEX: 23.19 KG/M2 | HEIGHT: 70 IN | WEIGHT: 162 LBS | HEART RATE: 69 BPM | SYSTOLIC BLOOD PRESSURE: 92 MMHG | RESPIRATION RATE: 12 BRPM | DIASTOLIC BLOOD PRESSURE: 60 MMHG | OXYGEN SATURATION: 99 %

## 2024-09-27 DIAGNOSIS — G47.33 OSA (OBSTRUCTIVE SLEEP APNEA): ICD-10-CM

## 2024-09-27 PROCEDURE — 3078F DIAST BP <80 MM HG: CPT | Performed by: NURSE PRACTITIONER

## 2024-09-27 PROCEDURE — 99214 OFFICE O/P EST MOD 30 MIN: CPT | Performed by: NURSE PRACTITIONER

## 2024-09-27 PROCEDURE — 99213 OFFICE O/P EST LOW 20 MIN: CPT | Performed by: NURSE PRACTITIONER

## 2024-09-27 PROCEDURE — 3074F SYST BP LT 130 MM HG: CPT | Performed by: NURSE PRACTITIONER

## 2024-09-27 ASSESSMENT — FIBROSIS 4 INDEX: FIB4 SCORE: 1.03

## 2024-09-27 NOTE — PROGRESS NOTES
Chief Complaint   Patient presents with    Follow-Up     LOV 4/13/24 [facility] 5/26/24       HPI:  Fuad Hwang is a 59 y.o. year old male here today for follow-up on ration results.  Last seen by me on 5/6/2024 for initial sleep study results.   He was diagnosed with obstructive sleep apnea around 2014/2015 via in lab sleep study in [place].  Following diagnosis he was placed on a CPAP machine which she used for several years.  He stopped using his CPAP machine a few years ago when it started to malfunction and not work properly.  He reports since his last sleep study he has lost approximately 100 pounds with diet and exercise.  He has found that his sleep has improved.  He still occasionally has sleepiness during the day and has rare instances of dozing off at work around lunchtime.     He currently sleeps alone.  He has been told that he snores in the past.  Has been told that he has choking and gasping and pauses in breathing in the past.  He does occasionally wake with a dry mouth.  He does occasionally have a headache with awakening.  His sleep is restorative.    Split-night sleep study (4/13/2024):  1.  Severe obstructive sleep apnea with an overall AHI of 59 events an hour  2.  Significant nocturnal hypoxia seen during diagnostic portion with time at or below 88% saturation of 73 minutes.  Minimum oxygen saturation of 54%  3.  Supine REM sleep was seen during both diagnostic and treatment portion   4.  Patient was tried on CPAP, BiPAP and ASV after meeting split-night protocol  5.  Incomplete control of respiratory events on CPAP, BiPAP and ASV  6.  Does not meet criteria for complex sleep apnea based on treatment portion of study     Recommendations:  I recommend the patient return for a BiPAP titration due to the severity of sleep apnea and sleep-related hypoxia.  Patient may require ASV therapy with higher EPAP pressures if complex sleep apnea is seen on BiPAP therapy on repeat  titration    Titration study (5/26/2024):  1.  Patient used BiPAP during night of study  2.  Supine REM sleep was seen on BiPAP therapy  3.  Respiratory events appeared improved on BiPAP therapy     Recommendations:  I recommend auto BiPAP EPAP 8 IPAP 16 pressure support 4 cmH2O.  Patient used a medium [place] Full face mask during night of study.       ROS: As per HPI and otherwise negative if not stated.    Past Medical History:   Diagnosis Date    Apnea, sleep     Does not CPAP or BIPAP    CAD (coronary artery disease)     Cancer (HCC) 10/2022    Prostrate removed 2/2023    Daytime sleepiness     Family history of adverse effect to anesthesia     Mom had intraoperative awareness.    Hyperlipidemia     Hypertension     Snoring        Past Surgical History:   Procedure Laterality Date    APPENDECTOMY      MYRINGOTOMY      OTHER  ?, 1996    Cyst removed right neck childhood, Cyst removed right hand    PROSTATECTOMY, RADIAL      Prostate removed 2023    SHOULDER ARTHROSCOPY Left        Family History   Problem Relation Age of Onset    Heart Disease Mother     Diabetes Mother     Stroke Mother     No Known Problems Father     Cancer Maternal Grandmother         Lung, smoking    Heart Disease Maternal Grandmother     Diabetes Maternal Grandmother     Cancer Maternal Grandfather     No Known Problems Paternal Grandmother     Cancer Paternal Grandfather        Allergies as of 09/27/2024    (No Known Allergies)        Vitals:  There were no vitals taken for this visit.    Current medications as of today   Current Outpatient Medications   Medication Sig Dispense Refill    allopurinol (ZYLOPRIM) 300 MG Tab TAKE 1 TABLET BY MOUTH EVERY DAY 90 Tablet 3    amLODIPine (NORVASC) 5 MG Tab TAKE 1 TABLET BY MOUTH EVERY DAY. *APPOINTMENT NEEDED* 90 Tablet 3    sildenafil citrate (VIAGRA) 100 MG tablet Take 100 mg by mouth every 24 hours as needed.      ATORVASTATIN CALCIUM PO Take 20 mg by mouth every evening.      Ascorbic Acid  (VITAMIN C) 1000 MG Tab Take 1 Tablet by mouth every day.      Multiple Vitamins-Minerals (ZINC PO) Take 1 Tablet by mouth every day.      Cholecalciferol (VITAMIN D3 PO) Take 1 Capsule by mouth every day.      SILDENAFIL CITRATE PO Take 100 mg by mouth as needed (ED).      rosuvastatin (CRESTOR) 5 MG Tab Take 1 Tablet by mouth every evening. (Patient not taking: Reported on 8/22/2024) 90 Tablet 3    valacyclovir (VALTREX) 1 GM Tab Take 1 Tablet by mouth 2 times a day. (Patient taking differently: Take 1,000 mg by mouth as needed (Outbreak).) 14 Tablet 0    acetaminophen (TYLENOL) 500 MG Tab Take 500-1,000 mg by mouth every 6 hours as needed.      therapeutic multivitamin-minerals (THERAGRAN-M) Tab Take 1 Tab by mouth every day.      aspirin EC (ECOTRIN) 81 MG Tablet Delayed Response Take 81 mg by mouth every day.       No current facility-administered medications for this visit.         Physical Exam:   Gen:           Alert and oriented, No apparent distress. Mood and affect appropriate, normal interaction with examiner.  Eyes:          PERRL, EOM intact, sclere white, conjunctive moist.  Ears:          Not examined.   Hearing:     Grossly intact.  Nose:          Normal, no lesions or deformities.  Dentition:    Good dentition.  Oropharynx:   Tongue normal, posterior pharynx without erythema or exudate.  Neck:        Supple, trachea midline, no masses.  Respiratory Effort: No intercostal retractions or use of accessory muscles.   Lung Auscultation:      Clear to auscultation bilaterally; no rales, rhonchi or wheezing.  CV:            Regular rate and rhythm. No murmurs, rubs or gallops.  Abd:           Not examined.   Lymphadenopathy: Not examined.  Gait and Station: Normal.  Digits and Nails: No clubbing, cyanosis, petechiae, or nodes.   Cranial Nerves: II-XII grossly intact.  Skin:        No rashes, lesions or ulcers noted.               Ext:           No cyanosis or edema.      Assessment:  1. CLAIRE (obstructive  "sleep apnea)          Plan:   I reviewed with the patient the pathophysiology of obstructive sleep apnea, as well as potential cardiac and neurologic risks associated with untreated sleep apnea including CAD, HTN, pulmonary arterial hypertension, cardiac arrhythmias, heart attack or stroke.  CLAIRE patient's have increased risk of motor vehicle accidents, DM type II, chronic kidney disease and nonalcoholic liver disease.  He is cautioned against driving while sleepy for his safety and safety of others on the road. We reviewed treatment modalities for sleep apnea including CPAP/BiPAP therapy, ENT referral, dental appliance.      History of sleep apnea.  Patient presents to follow-up for titration study.  Patient did well on BiPAP.  Recommendation for auto BiPAP.  Order placed and sent to the following [employer] company.      DME : [facility]    Once you receive your new PAP machine from the [employer] company you're referred to, we must see you back for an office visit between 30-90 days of you using the machine to review compliance.  If you were ordered an ASV machine, we need to see you in office no sooner than your 60th day on therapy.     This is a very important time frame for insurance purposes. If you do not follow up with our office for compliance your insurance may not continue to pay for the machine. Also if you do not use the machine for at least 4 hours each night, you may be deemed \"Incompliant\", in which case the insurance may also not continue to pay for the machine.    If you are incompliant, you may have to surrender your machine to the DME company and start the process over if you wish to continue therapy after that. Meaning a new office consult and new sleep studies.    For your first visit back with our office, please bring the whole machine with the power cord. We will download the compliance off the SD card in the machine, and are able to make changes if need be in office. Some machines " have a modem and we can access the data wirelessly, if this is the case please make sure the DME company grants us access to your machine.  For all follow up appointments after that, you will only need to bring the SD card to the appointment.    If you are having any issues with the mask, you have a 30 day window to exchange and try something else with your DME company.  If you are having issues with the pressure, please call our office at [phone].  These issues can cause you to not be able to use machine appropriately, there for make you incompliant.    Please call our office if you have any questions.    Thank you, [facility].  [phone]      Please note that this dictation was created using voice recognition software. I have made every reasonable attempt to correct obvious errors, but it is possible there are errors of grammar and possibly content that I did not discover before finalizing the note.

## 2024-10-22 ENCOUNTER — HOSPITAL ENCOUNTER (OUTPATIENT)
Dept: LAB | Facility: MEDICAL CENTER | Age: 59
End: 2024-10-22
Attending: PHYSICIAN ASSISTANT
Payer: COMMERCIAL

## 2024-10-22 ENCOUNTER — HOSPITAL ENCOUNTER (OUTPATIENT)
Dept: LAB | Facility: MEDICAL CENTER | Age: 59
End: 2024-10-22
Attending: INTERNAL MEDICINE
Payer: COMMERCIAL

## 2024-10-22 DIAGNOSIS — R25.2 LEG CRAMPS: ICD-10-CM

## 2024-10-22 LAB — PSA SERPL-MCNC: <0.02 NG/ML (ref 0–4)

## 2024-10-22 PROCEDURE — 84153 ASSAY OF PSA TOTAL: CPT

## 2024-10-22 PROCEDURE — 80048 BASIC METABOLIC PNL TOTAL CA: CPT

## 2024-10-22 PROCEDURE — 36415 COLL VENOUS BLD VENIPUNCTURE: CPT

## 2024-10-23 LAB
ANION GAP SERPL CALC-SCNC: 11 MMOL/L (ref 7–16)
BUN SERPL-MCNC: 14 MG/DL (ref 8–22)
CALCIUM SERPL-MCNC: 9.8 MG/DL (ref 8.5–10.5)
CHLORIDE SERPL-SCNC: 101 MMOL/L (ref 96–112)
CO2 SERPL-SCNC: 27 MMOL/L (ref 20–33)
CREAT SERPL-MCNC: 0.62 MG/DL (ref 0.5–1.4)
GFR SERPLBLD CREATININE-BSD FMLA CKD-EPI: 109 ML/MIN/1.73 M 2
GLUCOSE SERPL-MCNC: 96 MG/DL (ref 65–99)
POTASSIUM SERPL-SCNC: 4 MMOL/L (ref 3.6–5.5)
SODIUM SERPL-SCNC: 139 MMOL/L (ref 135–145)

## 2024-11-04 ENCOUNTER — OFFICE VISIT (OUTPATIENT)
Dept: MEDICAL GROUP | Facility: PHYSICIAN GROUP | Age: 59
End: 2024-11-04
Payer: COMMERCIAL

## 2024-11-04 VITALS
DIASTOLIC BLOOD PRESSURE: 62 MMHG | WEIGHT: 173.2 LBS | TEMPERATURE: 97.4 F | HEART RATE: 62 BPM | HEIGHT: 70 IN | SYSTOLIC BLOOD PRESSURE: 110 MMHG | BODY MASS INDEX: 24.79 KG/M2 | OXYGEN SATURATION: 99 %

## 2024-11-04 DIAGNOSIS — B00.9 HERPES SIMPLEX: Chronic | ICD-10-CM

## 2024-11-04 DIAGNOSIS — I25.10 CORONARY ARTERY DISEASE INVOLVING NATIVE CORONARY ARTERY OF NATIVE HEART WITHOUT ANGINA PECTORIS: ICD-10-CM

## 2024-11-04 DIAGNOSIS — Z00.00 WELL ADULT EXAM: ICD-10-CM

## 2024-11-04 DIAGNOSIS — M1A.09X0 IDIOPATHIC CHRONIC GOUT OF MULTIPLE SITES WITHOUT TOPHUS: Chronic | ICD-10-CM

## 2024-11-04 DIAGNOSIS — C61 PROSTATE CANCER (HCC): ICD-10-CM

## 2024-11-04 DIAGNOSIS — G47.33 OSA ON CPAP: Chronic | ICD-10-CM

## 2024-11-04 DIAGNOSIS — E78.5 DYSLIPIDEMIA: Chronic | ICD-10-CM

## 2024-11-04 DIAGNOSIS — R73.03 PREDIABETES: Chronic | ICD-10-CM

## 2024-11-04 DIAGNOSIS — I10 ESSENTIAL HYPERTENSION: Chronic | ICD-10-CM

## 2024-11-04 PROCEDURE — 3078F DIAST BP <80 MM HG: CPT | Performed by: INTERNAL MEDICINE

## 2024-11-04 PROCEDURE — 3074F SYST BP LT 130 MM HG: CPT | Performed by: INTERNAL MEDICINE

## 2024-11-04 PROCEDURE — 99214 OFFICE O/P EST MOD 30 MIN: CPT | Performed by: INTERNAL MEDICINE

## 2024-11-04 RX ORDER — VALACYCLOVIR HYDROCHLORIDE 1 G/1
1000 TABLET, FILM COATED ORAL DAILY
Qty: 5 TABLET | Refills: 4 | Status: SHIPPED | OUTPATIENT
Start: 2024-11-04

## 2024-11-04 ASSESSMENT — FIBROSIS 4 INDEX: FIB4 SCORE: 1.03

## 2024-11-04 NOTE — ASSESSMENT & PLAN NOTE
Chronic condition.  Patient currently on diet therapy.  Lab test result discussed with the patient.

## 2024-11-04 NOTE — ASSESSMENT & PLAN NOTE
Chronic condition.  Patient status post prostatectomy.  Patient has been followed by urology service.  PSA monitored by specialist on regular basis.  The patient denies fever chill dysuria or hematuria.

## 2024-11-04 NOTE — ASSESSMENT & PLAN NOTE
Chronic condition.  Patient presently taking atorvastatin.  Recent lipid panel result discussed with the patient.

## 2024-11-04 NOTE — PROGRESS NOTES
PRIMARY CARE CLINIC VISIT        Chief Complaint   Patient presents with    Follow-Up     Check up       Follow-up hypertension  Hyperlipidemia  Coronary artery disease  Prostate cancer  Sleep apnea  Gout  Prediabetes  Test result  Medication refills    History of Present Illness     Prostate cancer (HCC)  Chronic condition.  Patient status post prostatectomy.  Patient has been followed by urology service.  PSA monitored by specialist on regular basis.  The patient denies fever chill dysuria or hematuria.    Coronary artery disease involving native coronary artery without angina pectoris  Chronic condition.  The patient presently followed by cardiology service.  Patient presently taking aspirin and atorvastatin.  Patient currently asymptomatic.    CLAIRE on CPAP  Chronic condition.  The patient been using CPAP on a nightly basis.  Patient followed by sleep specialist.  No new symptoms reported.    Gout  Chronic condition.  Patient taking allopurinol daily.  He denies gout flareup.    Essential hypertension  Chronic condition.  Currently taking amlodipine.  Blood pressure has been well-controlled.  No significant side effects reported.    Prediabetes  Chronic condition.  Patient currently on diet therapy.  Lab test result discussed with the patient.    Dyslipidemia  Chronic condition.  Patient presently taking atorvastatin.  Recent lipid panel result discussed with the patient.    Herpes simplex  Chronic condition.  Patient taking Valtrex as needed.  Patient request Rx refill.  Currently the patient asymptomatic.    Current Outpatient Medications on File Prior to Visit   Medication Sig Dispense Refill    allopurinol (ZYLOPRIM) 300 MG Tab TAKE 1 TABLET BY MOUTH EVERY DAY 90 Tablet 3    amLODIPine (NORVASC) 5 MG Tab TAKE 1 TABLET BY MOUTH EVERY DAY. *APPOINTMENT NEEDED* 90 Tablet 3    sildenafil citrate (VIAGRA) 100 MG tablet Take 100 mg by mouth every 24 hours as needed.      ATORVASTATIN CALCIUM PO Take 20 mg by mouth  every evening.      Multiple Vitamins-Minerals (ZINC PO) Take 1 Tablet by mouth every day.      Cholecalciferol (VITAMIN D3 PO) Take 1 Capsule by mouth every day.      therapeutic multivitamin-minerals (THERAGRAN-M) Tab Take 1 Tab by mouth every day.      aspirin EC (ECOTRIN) 81 MG Tablet Delayed Response Take 81 mg by mouth every day.      Ascorbic Acid (VITAMIN C) 1000 MG Tab Take 1 Tablet by mouth every day.       No current facility-administered medications on file prior to visit.        Allergies: Patient has no known allergies.    Current Outpatient Medications Ordered in Epic   Medication Sig Dispense Refill    valacyclovir (VALTREX) 1 GM Tab Take 1 Tablet by mouth every day. 5 Tablet 4    allopurinol (ZYLOPRIM) 300 MG Tab TAKE 1 TABLET BY MOUTH EVERY DAY 90 Tablet 3    amLODIPine (NORVASC) 5 MG Tab TAKE 1 TABLET BY MOUTH EVERY DAY. *APPOINTMENT NEEDED* 90 Tablet 3    sildenafil citrate (VIAGRA) 100 MG tablet Take 100 mg by mouth every 24 hours as needed.      ATORVASTATIN CALCIUM PO Take 20 mg by mouth every evening.      Multiple Vitamins-Minerals (ZINC PO) Take 1 Tablet by mouth every day.      Cholecalciferol (VITAMIN D3 PO) Take 1 Capsule by mouth every day.      therapeutic multivitamin-minerals (THERAGRAN-M) Tab Take 1 Tab by mouth every day.      aspirin EC (ECOTRIN) 81 MG Tablet Delayed Response Take 81 mg by mouth every day.      Ascorbic Acid (VITAMIN C) 1000 MG Tab Take 1 Tablet by mouth every day.       No current [facility]-ordered facility-administered medications on file.       Past Medical History:   Diagnosis Date    Apnea, sleep     Does not CPAP or BIPAP    CAD (coronary artery disease)     Cancer (HCC) 10/2022    Prostrate removed 2/2023    Daytime sleepiness     Family history of adverse effect to anesthesia     Mom had intraoperative awareness.    Hyperlipidemia     Hypertension     Snoring        Past Surgical History:   Procedure Laterality Date    APPENDECTOMY      MYRINGOTOMY      OTHER   ?, 1996    Cyst removed right neck childhood, Cyst removed right hand    PROSTATECTOMY, RADIAL      Prostate removed 2023    SHOULDER ARTHROSCOPY Left        Family History   Problem Relation Age of Onset    Heart Disease Mother     Diabetes Mother     Stroke Mother     No Known Problems Father     Cancer Maternal Grandmother         Lung, smoking    Heart Disease Maternal Grandmother     Diabetes Maternal Grandmother     Cancer Maternal Grandfather     No Known Problems Paternal Grandmother     Cancer Paternal Grandfather        Social History     Tobacco Use   Smoking Status Never   Smokeless Tobacco Never       Social History     Substance and Sexual Activity   Alcohol Use Yes    Alcohol/week: 1.8 oz    Types: 1 Cans of beer, 2 Standard drinks or equivalent per week    Comment: Not every week       Review of systems  As per HPI above. All other systems reviewed and negative.      Past Medical, Social, and Family history reviewed and [facility] in [facility]       LAB DATA:     I have independently reviewed / interpreted labs    Lab Results   Component Value Date/Time    HBA1C 5.5 08/19/2024 02:55 PM    HBA1C 5.4 04/08/2024 02:58 PM    HBA1C 5.6 06/24/2023 07:49 AM        Lab Results   Component Value Date/Time    WBC 7.1 09/09/2024 07:57 AM    HEMOGLOBIN 14.3 09/09/2024 07:57 AM    HEMATOCRIT 42.4 09/09/2024 07:57 AM    MCV 98.4 (H) 09/09/2024 07:57 AM    PLATELETCT 214 09/09/2024 07:57 AM       Lab Results   Component Value Date/Time    SODIUM 139 10/22/2024 01:00 PM    POTASSIUM 4.0 10/22/2024 01:00 PM    GLUCOSE 96 10/22/2024 01:00 PM    BUN 14 10/22/2024 01:00 PM    CREATININE 0.62 10/22/2024 01:00 PM       Lab Results   Component Value Date/Time    CHOLSTRLTOT 131 08/19/2024 02:55 PM    TRIGLYCERIDE 61 08/19/2024 02:55 PM    HDL 51 08/19/2024 02:55 PM    LDL 68 08/19/2024 02:55 PM       Lab Results   Component Value Date/Time    ALTSGPT 13 09/09/2024 07:57 AM          Objective     /62 (BP Location: Left arm,  "Patient Position: Sitting, BP Cuff Size: Adult)   Pulse 62   Temp 36.3 °C (97.4 °F) (Temporal)   Ht 1.778 m (5' 10\")   Wt 78.6 kg (173 lb 3.2 oz)   SpO2 99%    Body mass index is 24.85 kg/m².    General: alert in no apparent distress.  Cardiovascular: regular rate and rhythm  Pulmonary: lungs : no wheezing   Gastrointestinal: BS present.       Assessment and Plan     1. Prostate cancer (HCC)  Chronic condition.  Status post prostatectomy.  PSA result discussed with the patient.  Continue follow-up with urologist as directed    2. Coronary artery disease involving native coronary artery of native heart without angina pectoris  Chronic stable.  Patient continue to take aspirin 81 Mg daily amlodipine 5 mg daily and atorvastatin 20 Mg daily.  Continue follow-up with cardiology service as directed    3. CLAIRE on CPAP  Stable.  Continue CPAP use nightly.  Continue follow-up with sleep specialist    4. Herpes simplex  Chronic stable condition.  Patient currently asymptomatic.  Refilled Valtrex to take as needed.    5. Idiopathic chronic gout of multiple sites without tophus  Chronic stable.  Continue allopurinol 30 mg daily.  The patient currently asymptomatic    6. Essential hypertension  Chronic stable.  Continue amlodipine 5 mg daily.  Continue to monitor blood pressure on regular basis at home    7. Prediabetes  Chronic condition.  Lab test result discussed with patient.  Continue with diet and lifestyle modification.    8. Dyslipidemia  - ALANINE AMINO-TRANS; Future  - Lipid Profile; Future  Chronic stable.  Continue atorvastatin 20 Mg daily.                      Please note that this dictation was created using voice recognition software. I have made every reasonable attempt to correct obvious errors, but I expect that there are errors of grammar and possibly content that I did not discover before finalizing the note.    Aleksandr Parrish MD  Internal Medicine  [facility]  "

## 2024-11-04 NOTE — ASSESSMENT & PLAN NOTE
Chronic condition.  The patient presently followed by cardiology service.  Patient presently taking aspirin and atorvastatin.  Patient currently asymptomatic.

## 2024-11-04 NOTE — ASSESSMENT & PLAN NOTE
Chronic condition.  Patient taking Valtrex as needed.  Patient request Rx refill.  Currently the patient asymptomatic.

## 2024-11-04 NOTE — ASSESSMENT & PLAN NOTE
Chronic condition.  The patient been using CPAP on a nightly basis.  Patient followed by sleep specialist.  No new symptoms reported.

## 2024-11-04 NOTE — ASSESSMENT & PLAN NOTE
Chronic condition.  Currently taking amlodipine.  Blood pressure has been well-controlled.  No significant side effects reported.

## 2024-11-13 ENCOUNTER — OFFICE VISIT (OUTPATIENT)
Dept: CARDIOLOGY | Facility: MEDICAL CENTER | Age: 59
End: 2024-11-13
Attending: INTERNAL MEDICINE
Payer: COMMERCIAL

## 2024-11-13 VITALS
OXYGEN SATURATION: 97 % | WEIGHT: 177 LBS | HEIGHT: 70 IN | RESPIRATION RATE: 16 BRPM | DIASTOLIC BLOOD PRESSURE: 60 MMHG | HEART RATE: 60 BPM | BODY MASS INDEX: 25.34 KG/M2 | SYSTOLIC BLOOD PRESSURE: 108 MMHG

## 2024-11-13 DIAGNOSIS — I10 ESSENTIAL HYPERTENSION: Chronic | ICD-10-CM

## 2024-11-13 DIAGNOSIS — Q24.5 ANOMALOUS RIGHT CORONARY ARTERY: ICD-10-CM

## 2024-11-13 DIAGNOSIS — I25.10 CORONARY ARTERY DISEASE INVOLVING NATIVE CORONARY ARTERY OF NATIVE HEART WITHOUT ANGINA PECTORIS: ICD-10-CM

## 2024-11-13 DIAGNOSIS — E78.5 DYSLIPIDEMIA: Chronic | ICD-10-CM

## 2024-11-13 PROCEDURE — 3078F DIAST BP <80 MM HG: CPT | Performed by: INTERNAL MEDICINE

## 2024-11-13 PROCEDURE — 99214 OFFICE O/P EST MOD 30 MIN: CPT | Performed by: INTERNAL MEDICINE

## 2024-11-13 PROCEDURE — 3074F SYST BP LT 130 MM HG: CPT | Performed by: INTERNAL MEDICINE

## 2024-11-13 PROCEDURE — 99213 OFFICE O/P EST LOW 20 MIN: CPT | Performed by: INTERNAL MEDICINE

## 2024-11-13 ASSESSMENT — ENCOUNTER SYMPTOMS
CHILLS: 0
HEADACHES: 0
WEAKNESS: 0
MUSCULOSKELETAL NEGATIVE: 1
NERVOUS/ANXIOUS: 0
NAUSEA: 0
BRUISES/BLEEDS EASILY: 0
EYES NEGATIVE: 1
CLAUDICATION: 0
CONSTITUTIONAL NEGATIVE: 1
WEIGHT LOSS: 0
DEPRESSION: 0
PALPITATIONS: 0
RESPIRATORY NEGATIVE: 1
PSYCHIATRIC NEGATIVE: 1
GASTROINTESTINAL NEGATIVE: 1
NEUROLOGICAL NEGATIVE: 1
DIZZINESS: 0
FOCAL WEAKNESS: 0
VOMITING: 0
COUGH: 0
FEVER: 0
CARDIOVASCULAR NEGATIVE: 1
ABDOMINAL PAIN: 0
DOUBLE VISION: 0
BLURRED VISION: 0
SHORTNESS OF BREATH: 0
MYALGIAS: 0

## 2024-11-13 ASSESSMENT — FIBROSIS 4 INDEX: FIB4 SCORE: 1.03

## 2024-11-13 NOTE — PROGRESS NOTES
Chief Complaint   Patient presents with    Hypertension     F/V Dx Essential Hypertension        Subjective     Fuad Hwang is a 59 y.o. male who presents today for follow up of anomalous right coronary artery, hypertension and hyperlipidemia.    Since the patient's last visit on 08/26/24, he has been doing well clinically from cardiac standpoint. He denies fatigue, chest pain, shortness of breath, palpitations, lower extremity edema, dizziness or syncope. He underwent cardiac catheterization and was consulted with May Manzano who recommended against surgery. He keeps active walking 3 miles daily.    Past Medical History:   Diagnosis Date    Apnea, sleep     Does not CPAP or BIPAP    CAD (coronary artery disease)     Cancer (HCC) 10/2022    Prostrate removed 2/2023    Daytime sleepiness     Family history of adverse effect to anesthesia     Mom had intraoperative awareness.    Hyperlipidemia     Hypertension     Snoring      Past Surgical History:   Procedure Laterality Date    APPENDECTOMY      MYRINGOTOMY      OTHER  ?, 1996    Cyst removed right neck childhood, Cyst removed right hand    PROSTATECTOMY, RADIAL      Prostate removed 2023    SHOULDER ARTHROSCOPY Left      Family History   Problem Relation Age of Onset    Heart Disease Mother     Diabetes Mother     Stroke Mother     No Known Problems Father     Cancer Maternal Grandmother         Lung, smoking    Heart Disease Maternal Grandmother     Diabetes Maternal Grandmother     Cancer Maternal Grandfather     No Known Problems Paternal Grandmother     Cancer Paternal Grandfather      Social History     Socioeconomic History    Marital status:      Spouse name: Not on file    Number of children: Not on file    Years of education: Not on file    Highest education level: Not on file   Occupational History    Not on file   Tobacco Use    Smoking status: Never    Smokeless tobacco: Never   Vaping Use    Vaping status: Never Used   Substance  and Sexual Activity    Alcohol use: Yes     Alcohol/week: 1.8 oz     Types: 1 Cans of beer, 2 Standard drinks or equivalent per week     Comment: Not every week    Drug use: Never    Sexual activity: Not Currently     Comment: Monogamous , 2boys, manager    Other Topics Concern    Not on file   Social History Narrative    Not on file     Social Drivers of Health     Financial Resource Strain: Not on file   Food Insecurity: Not on file   Transportation Needs: Not on file   Physical Activity: Not on file   Stress: Not on file   Social Connections: Not on file   Intimate Partner Violence: Not on file   Housing Stability: Not on file     No Known Allergies  (Medications reviewed.)  Outpatient Encounter Medications as of 11/13/2024   Medication Sig Dispense Refill    valacyclovir (VALTREX) 1 GM Tab Take 1 Tablet by mouth every day. 5 Tablet 4    allopurinol (ZYLOPRIM) 300 MG Tab TAKE 1 TABLET BY MOUTH EVERY DAY 90 Tablet 3    amLODIPine (NORVASC) 5 MG Tab TAKE 1 TABLET BY MOUTH EVERY DAY. *APPOINTMENT NEEDED* 90 Tablet 3    sildenafil citrate (VIAGRA) 100 MG tablet Take 100 mg by mouth every 24 hours as needed.      ATORVASTATIN CALCIUM PO Take 20 mg by mouth every evening.      Ascorbic Acid (VITAMIN C) 1000 MG Tab Take 1 Tablet by mouth every day.      Multiple Vitamins-Minerals (ZINC PO) Take 1 Tablet by mouth every day.      Cholecalciferol (VITAMIN D3 PO) Take 1 Capsule by mouth every day.      therapeutic multivitamin-minerals (THERAGRAN-M) Tab Take 1 Tab by mouth every day.      aspirin EC (ECOTRIN) 81 MG Tablet Delayed Response Take 81 mg by mouth every day.       No facility-administered encounter medications on file as of 11/13/2024.     Review of Systems   Constitutional: Negative.  Negative for chills, fever, malaise/fatigue and weight loss.   HENT: Negative.  Negative for hearing loss.    Eyes: Negative.  Negative for blurred vision and double vision.   Respiratory: Negative.  Negative for  "cough and shortness of breath.    Cardiovascular: Negative.  Negative for chest pain, palpitations, claudication and leg swelling.   Gastrointestinal: Negative.  Negative for abdominal pain, nausea and vomiting.   Genitourinary: Negative.  Negative for dysuria and urgency.   Musculoskeletal: Negative.  Negative for joint pain and myalgias.   Skin: Negative.  Negative for itching and rash.   Neurological: Negative.  Negative for dizziness, focal weakness, weakness and headaches.   Endo/Heme/Allergies: Negative.  Does not bruise/bleed easily.   Psychiatric/Behavioral: Negative.  Negative for depression. The patient is not nervous/anxious.               Objective     /60 (BP Location: Left arm, Patient Position: Sitting, BP Cuff Size: Adult)   Pulse 60   Resp 16   Ht 1.778 m (5' 10\")   Wt 80.3 kg (177 lb)   SpO2 97%   BMI 25.40 kg/m²     Physical Exam  Constitutional:       Appearance: Normal appearance. He is well-developed and normal weight.   HENT:      Head: Normocephalic and atraumatic.   Neck:      Vascular: No JVD.   Cardiovascular:      Rate and Rhythm: Normal rate and regular rhythm.      Heart sounds: Normal heart sounds.   Pulmonary:      Effort: Pulmonary effort is normal.      Breath sounds: Normal breath sounds.   Abdominal:      General: Bowel sounds are normal.      Palpations: Abdomen is soft.      Comments: No hepatosplenomegaly.   Musculoskeletal:         General: Normal range of motion.   Lymphadenopathy:      Cervical: No cervical adenopathy.   Skin:     General: Skin is warm and dry.   Neurological:      Mental Status: He is alert and oriented to person, place, and time.            CARDIAC STUDIES/PROCEDURES:    CARDIAC CATHETERIZATION CONCLUSIONS by Sanford Sahni (09/11/24)  Non-obstructive coronary artery disease   (study result reviewed)      CTA OF HEART 3D (08/14/24)    1. Anomalous RCA origin from the left coronary cusp, with interarterial course. This results in " 50-69% luminal narrowing of the proximal RCA and may predispose RCA territory ischemia.  2. Mixed plaque in the proximal-mid LAD results in approximately 50% stenosis. Consider functional assessment.  3. Minimal plaque in the RCA, LCx and proximal LAD results in no hemodynamically significant stenosis.  CAD-RADS category: 3. Moderate coronary stenosis, estimated at 50%-69%. Consider functional assessment.    CTA OF HEART [facility] (2014)  Right coronary artery There is an anomalous origin of the right coronary artery  with an origin in the left coronary cusp. The right coronary artery has an eccentric slit like ostium with an MLA of 11 mm2. There is mild motion artifact in the mid right coronary artery.     ECHOCARDIOGRAM CONCLUSIONS (09/16/21)  No prior study is available for comparison.   Normal left ventricular size, thickness, systolic function, and diastolic function.  Structurally normal aortic valve without stenosis.   Trace aortic insufficiency.  Mild pulmonic insufficiency.     EKG performed on (04/12/24) EKG shows sinus bradycardia with right bundle branch block.      Laboratory results of (08/19/24) Cholesterol profile of 131/67/51/68 mg/dL noted.  Laboratory results of (07/22/24) Cholesterol profile of 178/73/60/103 mg/dL noted.   Laboratory results of (04/08/24) Cholesterol profile of 186/76/63/108 mg/dL noted.      MYOCARDIAL PERFUSION STUDY CONCLUSIONS (0426/24)  NUCLEAR IMAGING INTERPRETATION  Normal exercise myocardial perfusion study.  No evidence of ischemia or infarct.  SDS 0.  LVEF 59%.  Sinus rhythm with right bundle branch block at rest.  Negative stress ECG for ischemia.  Good exercise tolerance.  Philip 07:00, 8.9 METs.  Occasional PVCs.  ECG INTERPRETATION  Negative stress ECG for ischemia.    Assessment & Plan     1. Anomalous right coronary artery        2. Coronary artery disease involving native coronary artery of native heart without angina pectoris        3. Essential  hypertension        4. Dyslipidemia            Medical Decision Making: Today's Assessment/Status/Plan:        Anomalous right coronary artery, non obstructive coronary artery disease: He is a 59 year old male with anomalous right coronary artery, hypertension and hyperlipidemia. He is clinically doing well, from cardiac standpoint. He consulted with May Manzano who recommended against surgery. We will continue with current medical therapy including atorvastatin.   Hypertension: Currently, the average blood pressure is well controlled. We will continue with amlodipine.  Hyperlipidemia: He is doing well on statin therapy without myalgia symptoms.  Additional information: He works as a . He enjoys riding his [employer] motorcycle.     We will follow up the patient in 1 year.    CC Aleksandr Corral

## 2025-01-06 ENCOUNTER — OFFICE VISIT (OUTPATIENT)
Dept: SLEEP MEDICINE | Facility: MEDICAL CENTER | Age: 60
End: 2025-01-06
Attending: NURSE PRACTITIONER
Payer: COMMERCIAL

## 2025-01-06 VITALS
SYSTOLIC BLOOD PRESSURE: 118 MMHG | RESPIRATION RATE: 12 BRPM | DIASTOLIC BLOOD PRESSURE: 60 MMHG | WEIGHT: 163 LBS | HEART RATE: 57 BPM | BODY MASS INDEX: 23.34 KG/M2 | OXYGEN SATURATION: 96 % | HEIGHT: 70 IN

## 2025-01-06 DIAGNOSIS — Z00.00 WELL ADULT EXAM: ICD-10-CM

## 2025-01-06 DIAGNOSIS — G47.33 OSA (OBSTRUCTIVE SLEEP APNEA): ICD-10-CM

## 2025-01-06 DIAGNOSIS — M1A.09X0 IDIOPATHIC CHRONIC GOUT OF MULTIPLE SITES WITHOUT TOPHUS: ICD-10-CM

## 2025-01-06 PROCEDURE — 3074F SYST BP LT 130 MM HG: CPT | Performed by: NURSE PRACTITIONER

## 2025-01-06 PROCEDURE — 99214 OFFICE O/P EST MOD 30 MIN: CPT | Performed by: NURSE PRACTITIONER

## 2025-01-06 PROCEDURE — 99213 OFFICE O/P EST LOW 20 MIN: CPT | Performed by: NURSE PRACTITIONER

## 2025-01-06 PROCEDURE — 3078F DIAST BP <80 MM HG: CPT | Performed by: NURSE PRACTITIONER

## 2025-01-06 RX ORDER — VALACYCLOVIR HYDROCHLORIDE 1 G/1
1000 TABLET, FILM COATED ORAL DAILY
Qty: 5 TABLET | Refills: 0 | Status: SHIPPED | OUTPATIENT
Start: 2025-01-06

## 2025-01-06 RX ORDER — AMLODIPINE BESYLATE 5 MG/1
TABLET ORAL
Qty: 90 TABLET | Refills: 0 | Status: SHIPPED | OUTPATIENT
Start: 2025-01-06

## 2025-01-06 RX ORDER — ALLOPURINOL 300 MG/1
300 TABLET ORAL DAILY
Qty: 90 TABLET | Refills: 0 | Status: SHIPPED | OUTPATIENT
Start: 2025-01-06

## 2025-01-06 ASSESSMENT — PATIENT HEALTH QUESTIONNAIRE - PHQ9: CLINICAL INTERPRETATION OF PHQ2 SCORE: 0

## 2025-01-06 ASSESSMENT — FIBROSIS 4 INDEX: FIB4 SCORE: 1.03

## 2025-01-06 NOTE — PROGRESS NOTES
Chief Complaint   Patient presents with    Follow-Up     SLEEP - ESTABLISHED PT CHART PREP COMPLETED ON 12/31/2024     Last Office Visit 09/27/2024 with Ubmerto Wilson    1st Compliance: Yes    DME: [facility]    PAP/O2/OAT: [employer] AirCurve 11 VAuto  Machine Type: Auto BIPAP  Max IPAP (cmH2O): 16  Min EPAP (cmH2O): 8  PS: 4      Wireless Data? YES [employer]Med         HPI:  Fuad Hwang is a 59 y.o. year old male here today for follow-up on CLAIRE with first compliance on auto BiPAP. He was diagnosed with obstructive sleep apnea around 2014/2015 via in lab sleep study in [place]. Following diagnosis he was placed on a CPAP machine which he used for several years. He stopped using his CPAP machine a few years ago when it started to malfunction and not work properly.  An order was placed for new PAP therapy and patient was set up on 10/11/2024.    Currently using a hybrid fullface mask.  Does have occasional dry mouth, but denies any difficulty with mask leak or pressure intolerance.  Gets between 8 to 9 hours average of sleep, but 4 hours on a bad night due to busy work schedule.  Currently on auto BiPAP settings.  Does not yet noted improvement in sleep quality.  Denies excessive daytime sleepiness or morning headaches or palpitations.    30-day compliance on auto BiPAP with settings 16/8/4 cm/H2O shows 100% use with an average time of 6 hours and 30 minutes and a residual AHI of 30.5 with an apnea index of 29.2 and a obstructive apnea index of 24.1.        Sleep history:  Split-night sleep study (4/13/2024):  1.  Severe obstructive sleep apnea with an overall AHI of 59 events an hour  2.  Significant nocturnal hypoxia seen during diagnostic portion with time at or below 88% saturation of 73 minutes.  Minimum oxygen saturation of 54%  3.  Supine REM sleep was seen during both diagnostic and treatment portion   4.  Patient was tried on CPAP, BiPAP and ASV after meeting split-night protocol  5.  Incomplete control of  respiratory events on CPAP, BiPAP and ASV  6.  Does not meet criteria for complex sleep apnea based on treatment portion of study     Recommendations:  I recommend the patient return for a BiPAP titration due to the severity of sleep apnea and sleep-related hypoxia.  Patient may require ASV therapy with higher EPAP pressures if complex sleep apnea is seen on BiPAP therapy on repeat titration     Titration study (5/26/2024):  1.  Patient used BiPAP during night of study  2.  Supine REM sleep was seen on BiPAP therapy  3.  Respiratory events appeared improved on BiPAP therapy     Recommendations:  I recommend auto BiPAP EPAP 8 IPAP 16 pressure support 4 cmH2O.  Patient used a medium [place] Full face mask during night of study.    ROS: As per HPI and otherwise negative if not stated.    Past Medical History:   Diagnosis Date    Apnea, sleep     Does not CPAP or BIPAP    CAD (coronary artery disease)     Cancer (HCC) 10/2022    Prostrate removed 2/2023    Daytime sleepiness     Family history of adverse effect to anesthesia     Mom had intraoperative awareness.    Hyperlipidemia     Hypertension     Snoring        Past Surgical History:   Procedure Laterality Date    APPENDECTOMY      MYRINGOTOMY      OTHER  ?, 1996    Cyst removed right neck childhood, Cyst removed right hand    PROSTATECTOMY, RADIAL      Prostate removed 2023    SHOULDER ARTHROSCOPY Left        Family History   Problem Relation Age of Onset    Heart Disease Mother     Diabetes Mother     Stroke Mother     No Known Problems Father     Cancer Maternal Grandmother         Lung, smoking    Heart Disease Maternal Grandmother     Diabetes Maternal Grandmother     Cancer Maternal Grandfather     No Known Problems Paternal Grandmother     Cancer Paternal Grandfather        Allergies as of 01/06/2025    (No Known Allergies)        Vitals:  /60 (BP Location: Left arm, Patient Position: Sitting, BP Cuff Size: Adult)   Pulse (!) 57   Resp 12   Ht 1.778 m  "(5' 10\")   Wt 73.9 kg (163 lb)   SpO2 96%     Current medications as of today   Current Outpatient Medications   Medication Sig Dispense Refill    sildenafil citrate (VIAGRA) 100 MG tablet Take 100 mg by mouth every 24 hours as needed.      ATORVASTATIN CALCIUM PO Take 20 mg by mouth every evening.      Ascorbic Acid (VITAMIN C) 1000 MG Tab Take 1 Tablet by mouth every day.      Multiple Vitamins-Minerals (ZINC PO) Take 1 Tablet by mouth every day.      Cholecalciferol (VITAMIN D3 PO) Take 1 Capsule by mouth every day.      therapeutic multivitamin-minerals (THERAGRAN-M) Tab Take 1 Tab by mouth every day.      aspirin EC (ECOTRIN) 81 MG Tablet Delayed Response Take 81 mg by mouth every day.      allopurinol (ZYLOPRIM) 300 MG Tab Take 1 Tablet by mouth every day. 90 Tablet 0    valacyclovir (VALTREX) 1 GM Tab Take 1 Tablet by mouth every day. 5 Tablet 0    amLODIPine (NORVASC) 5 MG Tab TAKE 1 TABLET BY MOUTH EVERY DAY. 90 Tablet 0     No current facility-administered medications for this visit.         Physical Exam:   Gen:           Alert and oriented, No apparent distress. Mood and affect appropriate, normal interaction with examiner.  Eyes:          PERRL, EOM intact, sclere white, conjunctive moist.  Ears:          Not examined.   Hearing:     Grossly intact.  Nose:          Normal, no lesions or deformities.  Dentition:    Good dentition.  Oropharynx:   Tongue normal, posterior pharynx without erythema or exudate.  Neck:        Supple, trachea midline, no masses.  Respiratory Effort: No intercostal retractions or use of accessory muscles.   Lung Auscultation:      Clear to auscultation bilaterally; no rales, rhonchi or wheezing.  CV:            Regular rate and rhythm. No murmurs, rubs or gallops.  Abd:           Not examined.   Lymphadenopathy: Not examined.  Gait and Station: Normal.  Digits and Nails: No clubbing, cyanosis, petechiae, or nodes.   Cranial Nerves: II-XII grossly intact.  Skin:        No " rashes, lesions or ulcers noted.               Ext:           No cyanosis or edema.      Assessment:  1. CLAIRE (obstructive sleep apnea)            Plan:  Elevated AHI on current auto BiPAP settings of 30.5.  Patient is compliant with device, but not yet noting improvement from sleep quality.  Review of titration, will change to spontaneous BiPAP with settings 14/8 cm/H2O.  Will message patient in 1 week to check compliance for the past week.  Patient to follow-up in 3 months for assessment.  After 1 week if AHI is normalized, will continue current therapy, but can potentially change to other pressures as well    Please note that this dictation was created using voice recognition software. I have made every reasonable attempt to correct obvious errors, but it is possible there are errors of grammar and possibly content that I did not discover before finalizing the note.

## 2025-01-06 NOTE — TELEPHONE ENCOUNTER
Received request via: Pharmacy    Was the patient seen in the last year in this department? Yes    Does the patient have an active prescription (recently filled or refills available) for medication(s) requested? Yes. Pharmacy change    Pharmacy Name:   [facility] - [address] [address]  Phone: [phone] Fax: [phone]        Does the patient have long term Plus and need 100-day supply? (This applies to ALL medications) Patient does not have SCP

## 2025-01-07 ENCOUNTER — PATIENT MESSAGE (OUTPATIENT)
Dept: SLEEP MEDICINE | Facility: MEDICAL CENTER | Age: 60
End: 2025-01-07
Payer: COMMERCIAL

## 2025-02-21 ENCOUNTER — PATIENT MESSAGE (OUTPATIENT)
Dept: MEDICAL GROUP | Facility: PHYSICIAN GROUP | Age: 60
End: 2025-02-21
Payer: COMMERCIAL

## 2025-02-21 RX ORDER — ROSUVASTATIN CALCIUM 5 MG/1
1 TABLET, COATED ORAL EVERY EVENING
COMMUNITY
End: 2025-02-21 | Stop reason: SDUPTHER

## 2025-02-21 NOTE — PATIENT COMMUNICATION
Received request via: Patient    Was the patient seen in the last year in this department? Yes    Does the patient have an active prescription (recently filled or refills available) for medication(s) requested? No    [facility]    Does the patient have [facility] Plus and need 100-day supply? (This applies to ALL medications) Patient does not have SCP

## 2025-02-22 RX ORDER — ROSUVASTATIN CALCIUM 5 MG/1
5 TABLET, COATED ORAL EVERY EVENING
Qty: 100 TABLET | Refills: 3 | Status: SHIPPED | OUTPATIENT
Start: 2025-02-22

## 2025-03-01 ENCOUNTER — OFFICE VISIT (OUTPATIENT)
Dept: URGENT CARE | Facility: PHYSICIAN GROUP | Age: 60
End: 2025-03-01
Payer: COMMERCIAL

## 2025-03-01 VITALS
BODY MASS INDEX: 24.98 KG/M2 | OXYGEN SATURATION: 97 % | HEART RATE: 69 BPM | HEIGHT: 70 IN | RESPIRATION RATE: 18 BRPM | DIASTOLIC BLOOD PRESSURE: 68 MMHG | SYSTOLIC BLOOD PRESSURE: 114 MMHG | WEIGHT: 174.49 LBS | TEMPERATURE: 97.9 F

## 2025-03-01 DIAGNOSIS — R05.1 ACUTE COUGH: ICD-10-CM

## 2025-03-01 DIAGNOSIS — H00.012 HORDEOLUM EXTERNUM OF RIGHT LOWER EYELID: ICD-10-CM

## 2025-03-01 LAB
FLUAV RNA SPEC QL NAA+PROBE: NEGATIVE
FLUBV RNA SPEC QL NAA+PROBE: NEGATIVE
RSV RNA SPEC QL NAA+PROBE: NEGATIVE
S PYO DNA SPEC NAA+PROBE: NOT DETECTED
SARS-COV-2 RNA RESP QL NAA+PROBE: NEGATIVE

## 2025-03-01 PROCEDURE — 3074F SYST BP LT 130 MM HG: CPT

## 2025-03-01 PROCEDURE — 87651 STREP A DNA AMP PROBE: CPT

## 2025-03-01 PROCEDURE — 99213 OFFICE O/P EST LOW 20 MIN: CPT

## 2025-03-01 PROCEDURE — 3078F DIAST BP <80 MM HG: CPT

## 2025-03-01 PROCEDURE — 0241U POCT CEPHEID COV-2, FLU A/B, RSV - PCR: CPT

## 2025-03-01 ASSESSMENT — ENCOUNTER SYMPTOMS
MYALGIAS: 0
DIAPHORESIS: 0
DIARRHEA: 0
BLOOD IN STOOL: 0
ABDOMINAL PAIN: 0
PSYCHIATRIC NEGATIVE: 1
VOMITING: 0
SORE THROAT: 1
DIZZINESS: 0
BLURRED VISION: 0
WEAKNESS: 0
WHEEZING: 0
SPUTUM PRODUCTION: 0
SHORTNESS OF BREATH: 0
SINUS PAIN: 0
NAUSEA: 0
HEADACHES: 0
COUGH: 1
CHILLS: 0
FEVER: 0
EYE DISCHARGE: 0

## 2025-03-01 ASSESSMENT — FIBROSIS 4 INDEX: FIB4 SCORE: 1.03

## 2025-03-01 NOTE — PROGRESS NOTES
"Subjective:   Fuad Hwang is a 59 y.o. male who presents for Cough (Nasal congestion, sore throat X 2 days. Possible stye (R) X 1 week.)      HPI  Patient complains of cough, nasal congestion and sore throat for 2 days  He has been taking Theraflu and advil    Stye to right eye for one week, he has been applying warm compress.     Review of Systems   Constitutional:  Negative for chills, diaphoresis, fever and malaise/fatigue.   HENT:  Positive for congestion and sore throat. Negative for ear pain and sinus pain.    Eyes:  Negative for blurred vision and discharge.   Respiratory:  Positive for cough. Negative for sputum production, shortness of breath and wheezing.    Cardiovascular:  Negative for chest pain.   Gastrointestinal:  Negative for abdominal pain, blood in stool, diarrhea, nausea and vomiting.   Genitourinary: Negative.    Musculoskeletal:  Negative for myalgias.   Skin:  Negative for rash.   Neurological:  Negative for dizziness, weakness and headaches.   Endo/Heme/Allergies: Negative.    Psychiatric/Behavioral: Negative.     All other systems reviewed and are negative.      Medical History:  Past Medical History:   Diagnosis Date    Apnea, sleep     Does not CPAP or BIPAP    CAD (coronary artery disease)     Cancer (HCC) 10/2022    Prostrate removed 2/2023    Daytime sleepiness     Family history of adverse effect to anesthesia     Mom had intraoperative awareness.    Hyperlipidemia     Hypertension     Snoring        Allergies:  No Known Allergies    Social history, surgical history, medications, and current problem list reviewed today in Epic.       Objective:     /68 (BP Location: Right arm, Patient Position: Sitting, BP Cuff Size: Adult)   Pulse 69   Temp 36.6 °C (97.9 °F) (Temporal)   Resp 18   Ht 1.778 m (5' 10\")   Wt 79.2 kg (174 lb 7.9 oz)   SpO2 97%     Physical Exam  Vitals reviewed.   Constitutional:       General: He is not in acute distress.     Appearance: Normal " appearance. He is not ill-appearing, toxic-appearing or diaphoretic.   HENT:      Head: Normocephalic.      Jaw: There is normal jaw occlusion.      Right Ear: Tympanic membrane, ear canal and external ear normal.      Left Ear: Tympanic membrane, ear canal and external ear normal.      Nose: Congestion and rhinorrhea present.      Right Sinus: No maxillary sinus tenderness or frontal sinus tenderness.      Left Sinus: No maxillary sinus tenderness or frontal sinus tenderness.      Mouth/Throat:      Lips: Pink.      Mouth: Mucous membranes are moist.      Tongue: Tongue does not deviate from midline.      Pharynx: Oropharynx is clear. Uvula midline. No oropharyngeal exudate, posterior oropharyngeal erythema or uvula swelling.   Eyes:      General:         Right eye: Hordeolum present. No discharge.         Left eye: No discharge.      Extraocular Movements: Extraocular movements intact.      Conjunctiva/sclera: Conjunctivae normal.      Pupils: Pupils are equal, round, and reactive to light.     Cardiovascular:      Rate and Rhythm: Normal rate and regular rhythm.      Pulses: Normal pulses.      Heart sounds: Normal heart sounds.   Pulmonary:      Effort: Pulmonary effort is normal.      Breath sounds: Normal breath sounds.   Abdominal:      General: Abdomen is flat.      Palpations: Abdomen is soft.      Tenderness: There is no abdominal tenderness.   Musculoskeletal:         General: Normal range of motion.      Cervical back: Normal range of motion and neck supple.   Lymphadenopathy:      Cervical: No cervical adenopathy.   Skin:     General: Skin is warm.      Capillary Refill: Capillary refill takes less than 2 seconds.   Neurological:      General: No focal deficit present.      Mental Status: He is alert and oriented to person, place, and time.   Psychiatric:         Mood and Affect: Mood normal.         Behavior: Behavior normal.         Assessment/Plan:       Diagnosis and associated orders:     1.  Hordeolum externum of right lower eyelid    2. Acute cough  - POCT CEPHEID GROUP A STREP - PCR  - POCT CEPHEID COV-2, FLU A/B, RSV - PCR     Comments/MDM:   I personally reviewed prior external notes and test results pertinent to today's visit as well as additional imaging and testing completed in clinic today.     Very pleasant 59-year-old afebrile, hemodynamically stable, generally well-appearing male presenting with complaints of cough, nasal congestion, sore throat for 2 days.  He has been taking over-the-counter medications with mild relief.  Normal pulmonary exam.  No concern for pneumonia or bronchitis.  ENT exam was positive for stye to right eye as well as nasal congestion and rhinorrhea.  He he did have recent encounters with possibly sick children.  In clinic strep test was negative; in clinic viral testing was negative.  Patient encouraged to continue warm compresses and stye will resolve on its own.  Patient is clinically stable at today's acute urgent care visit. Vital signs are normal and reassuring.  No acute distress noted. Appropriate for outpatient management at this time. No red flag warnings noted.  Patient given strict instructions to follow up with emergency room if they develop any red flag warnings which were discussed in depth.  They verbalized understanding.    Differential diagnosis, natural history, supportive care, and indications for immediate follow-up discussed. All questions answered. They agree with the plan of care.      Please note that this dictation was created using voice recognition software. I have made every reasonable attempt to correct obvious errors, but I expect that there are errors of grammar and possibly content that I did not discover before finalizing the note.

## 2025-03-01 NOTE — PATIENT INSTRUCTIONS
-A cough can persist for up to 6 weeks.  -Increase fluids.  -Eat whole foods that are high in vitamins and minerals to aid in healing.  -REST! REST! Rest! Let your body rest so it can heal.   -Cool-mist humidifier for nighttime use.   -Practice good hand hygiene.  -You may use either acetaminophen or ibuprofen over-the-counter every 6-8 hours for pain or fever if that is not contraindicated with your body.   -Chloraseptic lozenge, warm tea with honey or throat spray to help with discomfort.   -Zinc 25 mg has been shown to be effective in reducing the duration of cold symptoms.  -Saline Nasal Spray and Flonase may be used for congestion. Nasal saline in the nose helps to help break up nasal secretions and is beneficial for nasal symptoms and throat pain.  -Saline Nasal Rinse with a Neti pot or Danyel med rinse can be used multiple times a day. Be sure to use distilled water or boil tap water for 10 mins. Add a saline packet. Be sure the water is not too hot (around your body temp of 98 degrees)  -Decongestants are not recommended as they can have a rebound congestion effect along with many side effects (especially if you have high blood pressure).   -Salt water gargles for sore throat several times a day.

## 2025-04-14 ENCOUNTER — OFFICE VISIT (OUTPATIENT)
Dept: SLEEP MEDICINE | Facility: MEDICAL CENTER | Age: 60
End: 2025-04-14
Attending: NURSE PRACTITIONER
Payer: COMMERCIAL

## 2025-04-14 ENCOUNTER — PATIENT MESSAGE (OUTPATIENT)
Dept: SLEEP MEDICINE | Facility: MEDICAL CENTER | Age: 60
End: 2025-04-14
Payer: COMMERCIAL

## 2025-04-14 VITALS
HEIGHT: 70 IN | SYSTOLIC BLOOD PRESSURE: 92 MMHG | BODY MASS INDEX: 23.48 KG/M2 | OXYGEN SATURATION: 100 % | RESPIRATION RATE: 16 BRPM | HEART RATE: 51 BPM | WEIGHT: 164 LBS | DIASTOLIC BLOOD PRESSURE: 58 MMHG

## 2025-04-14 DIAGNOSIS — G47.33 OSA ON CPAP: ICD-10-CM

## 2025-04-14 PROCEDURE — 3074F SYST BP LT 130 MM HG: CPT | Performed by: NURSE PRACTITIONER

## 2025-04-14 PROCEDURE — 3078F DIAST BP <80 MM HG: CPT | Performed by: NURSE PRACTITIONER

## 2025-04-14 PROCEDURE — 99213 OFFICE O/P EST LOW 20 MIN: CPT | Performed by: NURSE PRACTITIONER

## 2025-04-14 PROCEDURE — 99214 OFFICE O/P EST MOD 30 MIN: CPT | Performed by: NURSE PRACTITIONER

## 2025-04-14 ASSESSMENT — FIBROSIS 4 INDEX: FIB4 SCORE: 1.03

## 2025-04-14 NOTE — PROGRESS NOTES
Chief Complaint   Patient presents with    Follow-Up     SLEEP - ESTABLISHED PT CHART PREP COMPLETED ON 04/09/2025  Setup date: 10/11/2024    Last Office Visit 01/06/2025 with Umberto Wilson    1st Compliance: No    DME: [facility]    PAP/O2/OAT: [facility] AirCurve 11 VAuto  Machine Type: Auto BIPAP  Max IPAP (cmH2O): 16  Min EPAP (cmH2O): 8  PS: 4    Wireless Data? YES [facility]         HPI:  Fuad Hwang is a 59 y.o. year old male here today for follow-up on CLAIRE on BiPAP with compliance reassessment. He was diagnosed with obstructive sleep apnea around 2014/2015 via in lab sleep study in [place]. Following diagnosis he was placed on a CPAP machine which he used for several years. He stopped using his CPAP machine a few years ago when it started to malfunction and not work properly.  An order was placed for new PAP therapy and patient was set up on 10/11/2024.     Currently using a hybrid fullface mask.  Does have occasional dry mouth, but denies any difficulty with mask leak or pressure intolerance.  Gets between 8 to 9 hours average of sleep, but 4 hours on a bad night due to busy work schedule.  Currently on auto BiPAP settings.  Does not yet noted improvement in sleep quality.  Denies excessive daytime sleepiness or morning headaches or palpitations.  Previous compliance showed AHI of 30.5.  Based on titration study, I did change to spontaneous BiPAP with 14/8 cm/H2O.    30-day compliance shows 93% use with an average time of 5 hours and 24 minutes and a residual AHI of 34.4 with an obstructive index of 24.9 and a central index of 7.8 on spontaneous BiPAP 14/8 cm/H2O.      Sleep history:  Split-night sleep study (4/13/2024):  1.  Severe obstructive sleep apnea with an overall AHI of 59 events an hour  2.  Significant nocturnal hypoxia seen during diagnostic portion with time at or below 88% saturation of 73 minutes.  Minimum oxygen saturation of 54%  3.  Supine REM sleep was seen during both diagnostic and  treatment portion   4.  Patient was tried on CPAP, BiPAP and ASV after meeting split-night protocol  5.  Incomplete control of respiratory events on CPAP, BiPAP and ASV  6.  Does not meet criteria for complex sleep apnea based on treatment portion of study     Recommendations:  I recommend the patient return for a BiPAP titration due to the severity of sleep apnea and sleep-related hypoxia.  Patient may require ASV therapy with higher EPAP pressures if complex sleep apnea is seen on BiPAP therapy on repeat titration     Titration study (5/26/2024):  1.  Patient used BiPAP during night of study  2.  Supine REM sleep was seen on BiPAP therapy  3.  Respiratory events appeared improved on BiPAP therapy     Recommendations:  I recommend auto BiPAP EPAP 8 IPAP 16 pressure support 4 cmH2O.  Patient used a medium Ab[place]ola Full face mask during night of study.    ROS: As per HPI and otherwise negative if not stated.    Past Medical History:   Diagnosis Date    Apnea, sleep     Does not CPAP or BIPAP    CAD (coronary artery disease)     Cancer (HCC) 10/2022    Prostrate removed 2/2023    Daytime sleepiness     Family history of adverse effect to anesthesia     Mom had intraoperative awareness.    Hyperlipidemia     Hypertension     Snoring        Past Surgical History:   Procedure Laterality Date    APPENDECTOMY      MYRINGOTOMY      OTHER  ?, 1996    Cyst removed right neck childhood, Cyst removed right hand    PROSTATECTOMY, RADIAL      Prostate removed 2023    SHOULDER ARTHROSCOPY Left        Family History   Problem Relation Age of Onset    Heart Disease Mother     Diabetes Mother     Stroke Mother     No Known Problems Father     Cancer Maternal Grandmother         Lung, smoking    Heart Disease Maternal Grandmother     Diabetes Maternal Grandmother     Cancer Maternal Grandfather     No Known Problems Paternal Grandmother     Cancer Paternal Grandfather        Allergies as of 04/14/2025    (No Known Allergies)  "       Vitals:  BP 92/58 (BP Location: Left arm, Patient Position: Sitting, BP Cuff Size: Adult)   Pulse (!) 51   Resp 16   Ht 1.778 m (5' 10\")   Wt 74.4 kg (164 lb)   SpO2 100%     Current medications as of today   Current Outpatient Medications   Medication Sig Dispense Refill    rosuvastatin (CRESTOR) 5 MG Tab Take 1 Tablet by mouth every evening. 100 Tablet 3    allopurinol (ZYLOPRIM) 300 MG Tab Take 1 Tablet by mouth every day. 90 Tablet 0    valacyclovir (VALTREX) 1 GM Tab Take 1 Tablet by mouth every day. 5 Tablet 0    amLODIPine (NORVASC) 5 MG Tab TAKE 1 TABLET BY MOUTH EVERY DAY. 90 Tablet 0    sildenafil citrate (VIAGRA) 100 MG tablet Take 100 mg by mouth every 24 hours as needed.      Ascorbic Acid (VITAMIN C) 1000 MG Tab Take 1 Tablet by mouth every day.      Multiple Vitamins-Minerals (ZINC PO) Take 1 Tablet by mouth every day.      Cholecalciferol (VITAMIN D3 PO) Take 1 Capsule by mouth every day.      therapeutic multivitamin-minerals (THERAGRAN-M) Tab Take 1 Tab by mouth every day.      aspirin EC (ECOTRIN) 81 MG Tablet Delayed Response Take 81 mg by mouth every day.      ATORVASTATIN CALCIUM PO Take 20 mg by mouth every evening. (Patient not taking: Reported on 3/1/2025)       No current facility-administered medications for this visit.         Physical Exam:   Gen:           Alert and oriented, No apparent distress. Mood and affect appropriate, normal interaction with examiner.  Eyes:          PERRL, EOM intact, sclere white, conjunctive moist.  Ears:          Not examined.   Hearing:     Grossly intact.  Nose:          Normal, no lesions or deformities.  Dentition:    Good dentition.  Oropharynx:   Tongue normal, posterior pharynx without erythema or exudate.  Neck:        Supple, trachea midline, no masses.  Respiratory Effort: No intercostal retractions or use of accessory muscles.   Lung Auscultation:      Clear to auscultation bilaterally; no rales, rhonchi or wheezing.  CV:          "   Regular rate and rhythm. No murmurs, rubs or gallops.  Abd:           Not examined.   Lymphadenopathy: Not examined.  Gait and Station: Normal.  Digits and Nails: No clubbing, cyanosis, petechiae, or nodes.   Cranial Nerves: II-XII grossly intact.  Skin:        No rashes, lesions or ulcers noted.               Ext:           No cyanosis or edema.      Assessment:  1. CLAIRE on CPAP          Plan:  Still has residual AHI in the 30s.  Will adjust back to auto BiPAP 16/8/5 cm/H2O.  Patient to message in 1 week for compliance check for 1 week data.  Patient to follow-up in 6 weeks.  If AHI is still elevated will likely move forward with titration.    Please note that this dictation was created using voice recognition software. I have made every reasonable attempt to correct obvious errors, but it is possible there are errors of grammar and possibly content that I did not discover before finalizing the note.

## 2025-04-28 ENCOUNTER — HOSPITAL ENCOUNTER (OUTPATIENT)
Dept: LAB | Facility: MEDICAL CENTER | Age: 60
End: 2025-04-28
Attending: PHYSICIAN ASSISTANT
Payer: COMMERCIAL

## 2025-04-28 ENCOUNTER — HOSPITAL ENCOUNTER (OUTPATIENT)
Dept: LAB | Facility: MEDICAL CENTER | Age: 60
End: 2025-04-28
Attending: INTERNAL MEDICINE
Payer: COMMERCIAL

## 2025-04-28 DIAGNOSIS — E78.5 DYSLIPIDEMIA: Chronic | ICD-10-CM

## 2025-04-28 DIAGNOSIS — Z00.00 WELL ADULT EXAM: ICD-10-CM

## 2025-04-28 LAB
BASOPHILS # BLD AUTO: 0.4 % (ref 0–1.8)
BASOPHILS # BLD: 0.03 K/UL (ref 0–0.12)
CREAT UR-MCNC: 201 MG/DL
EOSINOPHIL # BLD AUTO: 0.19 K/UL (ref 0–0.51)
EOSINOPHIL NFR BLD: 2.4 % (ref 0–6.9)
ERYTHROCYTE [DISTWIDTH] IN BLOOD BY AUTOMATED COUNT: 52.2 FL (ref 35.9–50)
HCT VFR BLD AUTO: 44 % (ref 42–52)
HGB BLD-MCNC: 14.7 G/DL (ref 14–18)
IMM GRANULOCYTES # BLD AUTO: 0.02 K/UL (ref 0–0.11)
IMM GRANULOCYTES NFR BLD AUTO: 0.3 % (ref 0–0.9)
LYMPHOCYTES # BLD AUTO: 1.77 K/UL (ref 1–4.8)
LYMPHOCYTES NFR BLD: 22.7 % (ref 22–41)
MCH RBC QN AUTO: 32.6 PG (ref 27–33)
MCHC RBC AUTO-ENTMCNC: 33.4 G/DL (ref 32.3–36.5)
MCV RBC AUTO: 97.6 FL (ref 81.4–97.8)
MICROALBUMIN UR-MCNC: <1.2 MG/DL
MICROALBUMIN/CREAT UR: NORMAL MG/G (ref 0–30)
MONOCYTES # BLD AUTO: 0.58 K/UL (ref 0–0.85)
MONOCYTES NFR BLD AUTO: 7.4 % (ref 0–13.4)
NEUTROPHILS # BLD AUTO: 5.22 K/UL (ref 1.82–7.42)
NEUTROPHILS NFR BLD: 66.8 % (ref 44–72)
NRBC # BLD AUTO: 0 K/UL
NRBC BLD-RTO: 0 /100 WBC (ref 0–0.2)
PLATELET # BLD AUTO: 259 K/UL (ref 164–446)
PMV BLD AUTO: 9.6 FL (ref 9–12.9)
PSA SERPL DL<=0.01 NG/ML-MCNC: <0.02 NG/ML (ref 0–4)
RBC # BLD AUTO: 4.51 M/UL (ref 4.7–6.1)
WBC # BLD AUTO: 7.8 K/UL (ref 4.8–10.8)

## 2025-04-28 PROCEDURE — 85025 COMPLETE CBC W/AUTO DIFF WBC: CPT

## 2025-04-28 PROCEDURE — 82043 UR ALBUMIN QUANTITATIVE: CPT

## 2025-04-28 PROCEDURE — 82570 ASSAY OF URINE CREATININE: CPT

## 2025-04-28 PROCEDURE — 36415 COLL VENOUS BLD VENIPUNCTURE: CPT

## 2025-04-28 PROCEDURE — 84460 ALANINE AMINO (ALT) (SGPT): CPT

## 2025-04-28 PROCEDURE — 84153 ASSAY OF PSA TOTAL: CPT

## 2025-04-28 PROCEDURE — 80048 BASIC METABOLIC PNL TOTAL CA: CPT

## 2025-04-28 PROCEDURE — 82306 VITAMIN D 25 HYDROXY: CPT

## 2025-04-28 PROCEDURE — 80061 LIPID PANEL: CPT

## 2025-04-29 ENCOUNTER — RESULTS FOLLOW-UP (OUTPATIENT)
Dept: MEDICAL GROUP | Facility: PHYSICIAN GROUP | Age: 60
End: 2025-04-29
Payer: COMMERCIAL

## 2025-04-29 DIAGNOSIS — G47.33 OSA (OBSTRUCTIVE SLEEP APNEA): ICD-10-CM

## 2025-04-29 LAB
25(OH)D3 SERPL-MCNC: 38 NG/ML (ref 30–100)
ALT SERPL-CCNC: 20 U/L (ref 2–50)
ANION GAP SERPL CALC-SCNC: 16 MMOL/L (ref 7–16)
BUN SERPL-MCNC: 20 MG/DL (ref 8–22)
CALCIUM SERPL-MCNC: 9.5 MG/DL (ref 8.5–10.5)
CHLORIDE SERPL-SCNC: 101 MMOL/L (ref 96–112)
CHOLEST SERPL-MCNC: 154 MG/DL (ref 100–199)
CO2 SERPL-SCNC: 24 MMOL/L (ref 20–33)
CREAT SERPL-MCNC: 0.76 MG/DL (ref 0.5–1.4)
GFR SERPLBLD CREATININE-BSD FMLA CKD-EPI: 102 ML/MIN/1.73 M 2
GLUCOSE SERPL-MCNC: 81 MG/DL (ref 65–99)
HDLC SERPL-MCNC: 72 MG/DL
LDLC SERPL CALC-MCNC: 69 MG/DL
POTASSIUM SERPL-SCNC: 4 MMOL/L (ref 3.6–5.5)
SODIUM SERPL-SCNC: 141 MMOL/L (ref 135–145)
TRIGL SERPL-MCNC: 63 MG/DL (ref 0–149)

## 2025-04-29 NOTE — PROGRESS NOTES
Compliance report reviewed and AHI continues to remain elevated with significant mask leak.  Recommend in lab titration study now.  [employer] message sent to patient to schedule.

## 2025-04-30 NOTE — Clinical Note
REFERRAL APPROVAL NOTICE         Sent on April 30, 2025                   Fuad Hwang  [address] [facility] [address]                   Dear Mr. Hwang,    After a careful review of the medical information and benefit coverage, [facility] has processed your referral. See below for additional details.    If applicable, you must be actively enrolled with your insurance for coverage of the authorized service. If you have any questions regarding your coverage, please contact your insurance directly.    REFERRAL INFORMATION   Referral #:  [id number]  Referred-To Department    Referred-By Provider:  Pulmonary and Sleep Medicine    DEVYN Lux   [facility]      [address]  [address]  [facility] [address]  [phone] [address]  [address]  [place] [id number] [phone]    Referral Start Date:  04/29/2025  Referral End Date:   04/29/2026             SCHEDULING  If you do not already have an appointment, please call [phone] to make an appointment.     MORE INFORMATION  If you do not already have a [facility] account, sign up at: [email or web address]  You can access your medical information, make appointments, see lab results, billing information, and more.  If you have questions regarding this referral, please contact  the [facility] Referrals department at:             [phone]. Monday - Friday 8:00AM - 5:00PM.     Sincerely,    [facility]

## 2025-05-01 RX ORDER — ATORVASTATIN CALCIUM 20 MG/1
1 TABLET, FILM COATED ORAL
COMMUNITY
End: 2025-05-05

## 2025-05-05 ENCOUNTER — OFFICE VISIT (OUTPATIENT)
Dept: MEDICAL GROUP | Facility: PHYSICIAN GROUP | Age: 60
End: 2025-05-05
Payer: COMMERCIAL

## 2025-05-05 VITALS
DIASTOLIC BLOOD PRESSURE: 60 MMHG | TEMPERATURE: 98 F | BODY MASS INDEX: 25.48 KG/M2 | WEIGHT: 178 LBS | HEART RATE: 60 BPM | RESPIRATION RATE: 16 BRPM | SYSTOLIC BLOOD PRESSURE: 112 MMHG | HEIGHT: 70 IN | OXYGEN SATURATION: 97 %

## 2025-05-05 DIAGNOSIS — K63.5 POLYP OF COLON, UNSPECIFIED PART OF COLON, UNSPECIFIED TYPE: Chronic | ICD-10-CM

## 2025-05-05 DIAGNOSIS — Q24.5 ANOMALOUS RIGHT CORONARY ARTERY: ICD-10-CM

## 2025-05-05 DIAGNOSIS — I10 ESSENTIAL HYPERTENSION: Chronic | ICD-10-CM

## 2025-05-05 DIAGNOSIS — M1A.09X0 IDIOPATHIC CHRONIC GOUT OF MULTIPLE SITES WITHOUT TOPHUS: Chronic | ICD-10-CM

## 2025-05-05 DIAGNOSIS — E55.9 VITAMIN D DEFICIENCY: ICD-10-CM

## 2025-05-05 DIAGNOSIS — C61 PROSTATE CANCER (HCC): ICD-10-CM

## 2025-05-05 DIAGNOSIS — E78.5 DYSLIPIDEMIA: Chronic | ICD-10-CM

## 2025-05-05 DIAGNOSIS — B00.9 HERPES SIMPLEX: Chronic | ICD-10-CM

## 2025-05-05 DIAGNOSIS — R73.03 PREDIABETES: Chronic | ICD-10-CM

## 2025-05-05 DIAGNOSIS — G47.33 OSA ON CPAP: Chronic | ICD-10-CM

## 2025-05-05 DIAGNOSIS — N52.8 OTHER MALE ERECTILE DYSFUNCTION: Chronic | ICD-10-CM

## 2025-05-05 DIAGNOSIS — Z23 NEED FOR VACCINATION: ICD-10-CM

## 2025-05-05 DIAGNOSIS — I25.10 CORONARY ARTERY DISEASE INVOLVING NATIVE CORONARY ARTERY OF NATIVE HEART WITHOUT ANGINA PECTORIS: ICD-10-CM

## 2025-05-05 PROBLEM — R25.2 LEG CRAMPS: Status: RESOLVED | Noted: 2024-08-15 | Resolved: 2025-05-05

## 2025-05-05 PROBLEM — Q24.9 CONGENITAL HEART DISEASE: Status: RESOLVED | Noted: 2024-03-07 | Resolved: 2025-05-05

## 2025-05-05 PROCEDURE — 99215 OFFICE O/P EST HI 40 MIN: CPT | Mod: 25 | Performed by: INTERNAL MEDICINE

## 2025-05-05 PROCEDURE — 90471 IMMUNIZATION ADMIN: CPT | Performed by: INTERNAL MEDICINE

## 2025-05-05 PROCEDURE — 3078F DIAST BP <80 MM HG: CPT | Performed by: INTERNAL MEDICINE

## 2025-05-05 PROCEDURE — 3074F SYST BP LT 130 MM HG: CPT | Performed by: INTERNAL MEDICINE

## 2025-05-05 PROCEDURE — 90677 PCV20 VACCINE IM: CPT | Performed by: INTERNAL MEDICINE

## 2025-05-05 RX ORDER — VITAMIN B COMPLEX
1000 TABLET ORAL DAILY
COMMUNITY

## 2025-05-05 RX ORDER — ROSUVASTATIN CALCIUM 5 MG/1
5 TABLET, COATED ORAL EVERY EVENING
Qty: 100 TABLET | Refills: 3 | Status: SHIPPED | OUTPATIENT
Start: 2025-05-05

## 2025-05-05 RX ORDER — VALACYCLOVIR HYDROCHLORIDE 1 G/1
1000 TABLET, FILM COATED ORAL DAILY
Qty: 5 TABLET | Refills: 5 | Status: SHIPPED | OUTPATIENT
Start: 2025-05-05

## 2025-05-05 ASSESSMENT — FIBROSIS 4 INDEX: FIB4 SCORE: 0.68

## 2025-05-05 NOTE — ASSESSMENT & PLAN NOTE
Chronic recurrent issue.  The patient takes Valtrex as needed.  Currently the patient asymptomatic

## 2025-05-05 NOTE — ASSESSMENT & PLAN NOTE
Chronic condition.  The patient has been using CPAP on a nightly basis.  The patient followed by sleep specialist  No symptoms reported

## 2025-05-05 NOTE — PROGRESS NOTES
PRIMARY CARE CLINIC VISIT        Chief Complaint   Patient presents with    Follow-Up    Lab Results      Prostate cancer  Coronary artery disease  Anomalous right coronary artery  Sleep apnea  Gout  Hypertension  Prediabetes  Hyperlipidemia  Colon polyp  Herpes simplex  Erectile dysfunction  Vitamin D deficiency  Request pneumonia shot  Prescription refills      History of Present Illness     Prostate cancer (HCC)   chronic condition.  Patient is status post prostatectomy  Followed by urology service.  Recent PSA below 0.02.  Similar to previous lab result  Patient currently asymptomatic    Coronary artery disease involving native coronary artery of native heart without angina pectoris  Chronic condition.  Followed by cardiology service.  Patient currently taking aspirin, rosuvastatin and amlodipine.  The patient denies chest pain or shortness of breath or palpitation.    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  Patient followed by cardiology service.  Patient currently asymptomatic.  According to cardiologist surgery not recommended.    CLAIRE on CPAP  Chronic condition.  The patient has been using CPAP on a nightly basis.  The patient followed by sleep specialist  No symptoms reported    Gout  Is a chronic condition.  The patient currently taking allopurinol.  Patient denies gout flareup.    Essential hypertension  Chronic problem.  The patient is taking amlodipine.  Blood pressure has been well-controlled.  Patient tolerating the medication well    Prediabetes  Chronic condition.  The patient is presently on diet therapy.  Recent lab test result discussed with the patient.  The patient asymptomatic    Dyslipidemia  Chronic condition.  The patient with coronary disease.  Patient currently taking rosuvastatin.  Recent lab test result discussed with the patient.  Patient tolerating medication well    Other male erectile  dysfunction  Chronic issue.  The patient take Viagra as needed.  No new concerns reported.  Patient tolerating medication well.    Colon polyp  Chronic condition.  Last colonoscopy done October 2023.  GI recommend to repeat in 2026  Patient denies GI bleeding or change in bowel movement.    Herpes simplex  Chronic recurrent issue.  The patient takes Valtrex as needed.  Currently the patient asymptomatic    Vitamin D deficiency  Chronic condition for the patient has been taking vitamin D supplementation.  Recent lab test result discussed with the patient.  No new symptoms reported.    Need for vaccination  Patient is due for pneumonia vaccination.    Current Outpatient Medications on File Prior to Visit   Medication Sig Dispense Refill    vitamin D3 (CHOLECALCIFEROL) 1000 Unit (25 mcg) Tab Take 1,000 Units by mouth every day.      allopurinol (ZYLOPRIM) 300 MG Tab Take 1 Tablet by mouth every day. 90 Tablet 0    amLODIPine (NORVASC) 5 MG Tab TAKE 1 TABLET BY MOUTH EVERY DAY. 90 Tablet 0    sildenafil citrate (VIAGRA) 100 MG tablet Take 100 mg by mouth every 24 hours as needed.      Ascorbic Acid (VITAMIN C) 1000 MG Tab Take 1 Tablet by mouth every day.      Multiple Vitamins-Minerals (ZINC PO) Take 1 Tablet by mouth every day.      Cholecalciferol (VITAMIN D3 PO) Take 1 Capsule by mouth every day.      therapeutic multivitamin-minerals (THERAGRAN-M) Tab Take 1 Tab by mouth every day.      aspirin EC (ECOTRIN) 81 MG Tablet Delayed Response Take 81 mg by mouth every day.       No current facility-administered medications on file prior to visit.        Allergies: Patient has no known allergies.    Current Outpatient Medications Ordered in Epic   Medication Sig Dispense Refill    rosuvastatin (CRESTOR) 5 MG Tab Take 1 Tablet by mouth every evening. 100 Tablet 3    valacyclovir (VALTREX) 1 GM Tab Take 1 Tablet by mouth every day. 5 Tablet 5    vitamin D3 (CHOLECALCIFEROL) 1000 Unit (25 mcg) Tab Take 1,000 Units by mouth  every day.      allopurinol (ZYLOPRIM) 300 MG Tab Take 1 Tablet by mouth every day. 90 Tablet 0    amLODIPine (NORVASC) 5 MG Tab TAKE 1 TABLET BY MOUTH EVERY DAY. 90 Tablet 0    sildenafil citrate (VIAGRA) 100 MG tablet Take 100 mg by mouth every 24 hours as needed.      Ascorbic Acid (VITAMIN C) 1000 MG Tab Take 1 Tablet by mouth every day.      Multiple Vitamins-Minerals (ZINC PO) Take 1 Tablet by mouth every day.      Cholecalciferol (VITAMIN D3 PO) Take 1 Capsule by mouth every day.      therapeutic multivitamin-minerals (THERAGRAN-M) Tab Take 1 Tab by mouth every day.      aspirin EC (ECOTRIN) 81 MG Tablet Delayed Response Take 81 mg by mouth every day.       No current [facility]-ordered facility-administered medications on file.       Past Medical History:   Diagnosis Date    Apnea, sleep     Does not CPAP or BIPAP    CAD (coronary artery disease)     Cancer (HCC) 10/2022    Prostrate removed 2/2023    Daytime sleepiness     Family history of adverse effect to anesthesia     Mom had intraoperative awareness.    Hyperlipidemia     Hypertension     Snoring        Past Surgical History:   Procedure Laterality Date    APPENDECTOMY      MYRINGOTOMY      OTHER  ?, 1996    Cyst removed right neck childhood, Cyst removed right hand    PROSTATECTOMY, RADIAL      Prostate removed 2023    SHOULDER ARTHROSCOPY Left        Family History   Problem Relation Age of Onset    Heart Disease Mother     Diabetes Mother     Stroke Mother     No Known Problems Father     Cancer Maternal Grandmother         Lung, smoking    Heart Disease Maternal Grandmother     Diabetes Maternal Grandmother     Cancer Maternal Grandfather     No Known Problems Paternal Grandmother     Cancer Paternal Grandfather        Social History     Tobacco Use   Smoking Status Never   Smokeless Tobacco Never       Social History     Substance and Sexual Activity   Alcohol Use Yes    Alcohol/week: 1.8 oz    Types: 1 Cans of beer, 2 Standard drinks or equivalent per  "week    Comment: Not every week       Review of systems  As per HPI above. All other systems reviewed and negative.      Past Medical, Social, and Family history reviewed and updated in EPIC       LAB DATA:     I have independently reviewed / interpreted labs    Lab Results   Component Value Date/Time    HBA1C 5.5 08/19/2024 02:55 PM    HBA1C 5.4 04/08/2024 02:58 PM    HBA1C 5.6 06/24/2023 07:49 AM        Lab Results   Component Value Date/Time    WBC 7.8 04/28/2025 02:20 PM    HEMOGLOBIN 14.7 04/28/2025 02:20 PM    HEMATOCRIT 44.0 04/28/2025 02:20 PM    MCV 97.6 04/28/2025 02:20 PM    PLATELETCT 259 04/28/2025 02:20 PM       Lab Results   Component Value Date/Time    SODIUM 141 04/28/2025 02:20 PM    POTASSIUM 4.0 04/28/2025 02:20 PM    GLUCOSE 81 04/28/2025 02:20 PM    BUN 20 04/28/2025 02:20 PM    CREATININE 0.76 04/28/2025 02:20 PM       Lab Results   Component Value Date/Time    CHOLSTRLTOT 154 04/28/2025 02:20 PM    TRIGLYCERIDE 63 04/28/2025 02:20 PM    HDL 72 04/28/2025 02:20 PM    LDL 69 04/28/2025 02:20 PM       Lab Results   Component Value Date/Time    ALTSGPT 20 04/28/2025 02:20 PM          Objective     /60 (BP Location: Right arm, Patient Position: Sitting, BP Cuff Size: Adult)   Pulse 60   Temp 36.7 °C (98 °F) (Temporal)   Resp 16   Ht 1.778 m (5' 10\")   Wt 80.7 kg (178 lb)   SpO2 97%    Body mass index is 25.54 kg/m².    General: alert in no apparent distress.  Cardiovascular: regular rate and rhythm  Pulmonary: lungs : no wheezing   Gastrointestinal: BS present.       Assessment and Plan     1. Prostate cancer (HCC)  Chronic condition.  Status post prostatectomy.  Patient has done well.  No new symptoms reported.  Patient denies fever chill dysuria or hematuria.  Continue follow-up with urologist.  Recent PSA remains stable lab test result discussed with the patient      2. Coronary artery disease involving native coronary artery of native heart without angina pectoris  Chronic " condition.  Stable.  Patient asymptomatic.  Continue aspirin 81 Mg daily and rosuvastatin 5 mg daily.  Continue follow-up with cardiology service    3. Anomalous right coronary artery  Chronic condition.  As above surgery not recommended by cardiology.  Patient asymptomatic.  Continue to monitor    4. CLAIRE on CPAP  Chronic stable.  Continue CPAP use nightly.  Follow-up sleep specialist as directed    5. Idiopathic chronic gout of multiple sites without tophus  Chronic stable.  Patient currently asymptomatic continue allopurinol 200 mg daily    6. Essential hypertension  Chronic condition.  Stable.  Continue amlodipine 5 mg daily    7. Prediabetes  - HEMOGLOBIN A1C; Future  - Basic Metabolic Panel; Future  Chronic stable.  Lab test results improved.  Continue to monitor.  Patient currently asymptomatic    8. Dyslipidemia  - rosuvastatin (CRESTOR) 5 MG Tab; Take 1 Tablet by mouth every evening.  Dispense: 100 Tablet; Refill: 3  - ALANINE AMINO-TRANS; Future  - Lipid Profile; Future  Chronic stable.  Lipid panel result discussed with the patient.  Continue Crestor 5 mg daily    9. Polyp of colon, unspecified part of colon, unspecified type  Chronic stable condition.  Patient to repeat colonoscopy 2026.  Currently the patient asymptomatic    10. Herpes simplex  - valacyclovir (VALTREX) 1 GM Tab; Take 1 Tablet by mouth every day.  Dispense: 5 Tablet; Refill: 5  Chronic recurrent condition.  Stable.  Patient currently asymptomatic.  The patient may take Valtrex as needed.  Refill sent to the pharmacy.    11. Other male erectile dysfunction  Chronic stable.  The patient takes Viagra as needed.    12. Vitamin D deficiency  Stable.  Lab test result discussed with the patient.  Continue vitamin D supplementation daily    13. Need for vaccination  - Pneumococcal Conjugate Vaccine 20-Valent    Other orders  - vitamin D3 (CHOLECALCIFEROL) 1000 Unit (25 mcg) Tab; Take 1,000 Units by mouth every day.                Time spent:   42   minutes     Reviewed medical records including:  April 14, 2025 pulmonary note  March 1, 2025 urgent care note  January 6, 2025 pulmonary note  November 13, 2024 cardiology note  November 4, 2024 medical office note  October 24, 2024 urology note  Laboratory data April 28, 2025, October 22, 2024, September 9, 2024    Total time includes face to face time and non-face to face time.  Chart review before the visit, the actual patient visit, and time spent on documentation in the EMR after the visit.  Chart review/prep, review of other providers' records, Independent review of imagings/labs ,  time for history/examination , pt's counseling/education, ordering, prescribing, treatment plan discussed with patient, and care coordination.          Please note that this dictation was created using voice recognition software. I have made every reasonable attempt to correct obvious errors, but I expect that there are errors of grammar and possibly content that I did not discover before finalizing the note.    Aleksandr Parrish MD  Internal Medicine  [facility]

## 2025-05-05 NOTE — ASSESSMENT & PLAN NOTE
Chronic condition.  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  Patient followed by cardiology service.  Patient currently asymptomatic.  According to cardiologist surgery not recommended.

## 2025-05-05 NOTE — ASSESSMENT & PLAN NOTE
Chronic condition.  The patient with coronary disease.  Patient currently taking rosuvastatin.  Recent lab test result discussed with the patient.  Patient tolerating medication well

## 2025-05-05 NOTE — ASSESSMENT & PLAN NOTE
Chronic issue.  The patient take Viagra as needed.  No new concerns reported.  Patient tolerating medication well.

## 2025-05-05 NOTE — ASSESSMENT & PLAN NOTE
Chronic condition for the patient has been taking vitamin D supplementation.  Recent lab test result discussed with the patient.  No new symptoms reported.

## 2025-05-05 NOTE — ASSESSMENT & PLAN NOTE
Chronic problem.  The patient is taking amlodipine.  Blood pressure has been well-controlled.  Patient tolerating the medication well

## 2025-05-05 NOTE — ASSESSMENT & PLAN NOTE
Chronic condition.  Followed by cardiology service.  Patient currently taking aspirin, rosuvastatin and amlodipine.  The patient denies chest pain or shortness of breath or palpitation.

## 2025-05-05 NOTE — ASSESSMENT & PLAN NOTE
Chronic condition.  The patient is presently on diet therapy.  Recent lab test result discussed with the patient.  The patient asymptomatic

## 2025-05-05 NOTE — ASSESSMENT & PLAN NOTE
chronic condition.  Patient is status post prostatectomy  Followed by urology service.  Recent PSA below 0.02.  Similar to previous lab result  Patient currently asymptomatic

## 2025-05-05 NOTE — ASSESSMENT & PLAN NOTE
Chronic condition.  Last colonoscopy done October 2023.  GI recommend to repeat in 2026  Patient denies GI bleeding or change in bowel movement.

## 2025-06-04 ENCOUNTER — OFFICE VISIT (OUTPATIENT)
Dept: SLEEP MEDICINE | Facility: MEDICAL CENTER | Age: 60
End: 2025-06-04
Attending: NURSE PRACTITIONER
Payer: COMMERCIAL

## 2025-06-04 VITALS
SYSTOLIC BLOOD PRESSURE: 100 MMHG | RESPIRATION RATE: 16 BRPM | OXYGEN SATURATION: 98 % | BODY MASS INDEX: 24.34 KG/M2 | HEIGHT: 70 IN | DIASTOLIC BLOOD PRESSURE: 52 MMHG | WEIGHT: 170 LBS | HEART RATE: 56 BPM

## 2025-06-04 DIAGNOSIS — G47.33 OSA (OBSTRUCTIVE SLEEP APNEA): Primary | ICD-10-CM

## 2025-06-04 PROCEDURE — 3078F DIAST BP <80 MM HG: CPT | Performed by: NURSE PRACTITIONER

## 2025-06-04 PROCEDURE — 3074F SYST BP LT 130 MM HG: CPT | Performed by: NURSE PRACTITIONER

## 2025-06-04 PROCEDURE — 99214 OFFICE O/P EST MOD 30 MIN: CPT | Performed by: NURSE PRACTITIONER

## 2025-06-04 ASSESSMENT — FIBROSIS 4 INDEX: FIB4 SCORE: 0.68

## 2025-06-04 NOTE — PROGRESS NOTES
No chief complaint on file.      HPI:  Fuad Hwang is a 59 y.o. year old male here today for follow-up on CLAIRE with compliance reassessment on BiPAP.. He was diagnosed with obstructive sleep apnea around 2014/2015 via in lab sleep study in [place]. Following diagnosis he was placed on a CPAP machine     Currently using a hybrid fullface mask. Does have occasional dry mouth, but denies any difficulty with mask leak or pressure intolerance. Gets between 8 to 9 hours average of sleep, but 4 hours on a bad night due to busy work schedule. Currently on auto BiPAP settings. Does not yet noted improvement in sleep quality. Denies excessive daytime sleepiness or morning headaches or palpitations. Previous compliance showed AHI of 30.5. Based on titration study, I did change to spontaneous BiPAP with 14/8 cm/H2O. last visit still elevated apnea hypopnea score, and was changed back to auto BiPAP 16/8/5 cm/H2O.    Patient states that he continues to struggle with mask fit.30-day compliance shows 100% use with an average time of 6 hours and 9 minutes and a residual apnea-hypopnea index of 31.3.  Evidence of mask leak.  Median of 30.9 L/min mask leak.  Maximum of 70. 9 L/min.  Obstructive apnea index of 10.7        Sleep history:  Split-night sleep study (4/13/2024):  1.  Severe obstructive sleep apnea with an overall AHI of 59 events an hour  2.  Significant nocturnal hypoxia seen during diagnostic portion with time at or below 88% saturation of 73 minutes.  Minimum oxygen saturation of 54%  3.  Supine REM sleep was seen during both diagnostic and treatment portion   4.  Patient was tried on CPAP, BiPAP and ASV after meeting split-night protocol  5.  Incomplete control of respiratory events on CPAP, BiPAP and ASV  6.  Does not meet criteria for complex sleep apnea based on treatment portion of study     Recommendations:  I recommend the patient return for a BiPAP titration due to the severity of sleep apnea and  sleep-related hypoxia.  Patient may require ASV therapy with higher EPAP pressures if complex sleep apnea is seen on BiPAP therapy on repeat titration     Titration study (5/26/2024):  1.  Patient used BiPAP during night of study  2.  Supine REM sleep was seen on BiPAP therapy  3.  Respiratory events appeared improved on BiPAP therapy     Recommendations:  I recommend auto BiPAP EPAP 8 IPAP 16 pressure support 4 cmH2O.  Patient used a medium [place] Full face mask during night of study.    ROS: As per HPI and otherwise negative if not stated.    Past Medical History[1]    Past Surgical History[2]    Family History   Problem Relation Age of Onset    Heart Disease Mother     Diabetes Mother     Stroke Mother     No Known Problems Father     Cancer Maternal Grandmother         Lung, smoking    Heart Disease Maternal Grandmother     Diabetes Maternal Grandmother     Cancer Maternal Grandfather     No Known Problems Paternal Grandmother     Cancer Paternal Grandfather        Allergies as of 06/04/2025    (No Known Allergies)        Vitals:  There were no vitals taken for this visit.    Current medications as of today Current Medications[3]      Physical Exam:   Gen:           Alert and oriented, No apparent distress. Mood and affect appropriate, normal interaction with examiner.  Eyes:          PERRL, EOM intact, sclere white, conjunctive moist.  Ears:          Not examined.   Hearing:     Grossly intact.  Nose:          Normal, no lesions or deformities.  Dentition:    Good dentition.  Oropharynx:   Tongue normal, posterior pharynx without erythema or exudate.  Neck:        Supple, trachea midline, no masses.  Respiratory Effort: No intercostal retractions or use of accessory muscles.   Lung Auscultation:      Clear to auscultation bilaterally; no rales, rhonchi or wheezing.  CV:            Regular rate and rhythm. No murmurs, rubs or gallops.  Abd:           Not examined.   Lymphadenopathy: Not examined.  Gait and  Station: Normal.  Digits and Nails: No clubbing, cyanosis, petechiae, or nodes.   Cranial Nerves: II-XII grossly intact.  Skin:        No rashes, lesions or ulcers noted.               Ext:           No cyanosis or edema.      Assessment:  1. CLAIRE (obstructive sleep apnea)          Plan:  Patient has been seen for first compliance on BiPAP.  Several adjustments have been made to BiPAP.  Still evidence of mask leak and poorly controlled sleep apnea.  Will move forward with BiPAP titration.  If necessary consider BiPAP ST, or ASV.  Patient advised to follow-up within 30 days    Please note that this dictation was created using voice recognition software. I have made every reasonable attempt to correct obvious errors, but it is possible there are errors of grammar and possibly content that I did not discover before finalizing the note.         [1]   Past Medical History:  Diagnosis Date    Apnea, sleep     Does not CPAP or BIPAP    CAD (coronary artery disease)     Cancer (HCC) 10/2022    Prostrate removed 2/2023    Daytime sleepiness     Family history of adverse effect to anesthesia     Mom had intraoperative awareness.    Hyperlipidemia     Hypertension     Snoring    [2]   Past Surgical History:  Procedure Laterality Date    APPENDECTOMY      MYRINGOTOMY      OTHER  ?, 1996    Cyst removed right neck childhood, Cyst removed right hand    PROSTATECTOMY, RADIAL      Prostate removed 2023    SHOULDER ARTHROSCOPY Left    [3]   Current Outpatient Medications   Medication Sig Dispense Refill    rosuvastatin (CRESTOR) 5 MG Tab Take 1 Tablet by mouth every evening. 100 Tablet 3    valacyclovir (VALTREX) 1 GM Tab Take 1 Tablet by mouth every day. 5 Tablet 5    vitamin D3 (CHOLECALCIFEROL) 1000 Unit (25 mcg) Tab Take 1,000 Units by mouth every day.      allopurinol (ZYLOPRIM) 300 MG Tab Take 1 Tablet by mouth every day. 90 Tablet 0    amLODIPine (NORVASC) 5 MG Tab TAKE 1 TABLET BY MOUTH EVERY DAY. 90 Tablet 0    sildenafil  citrate (VIAGRA) 100 MG tablet Take 100 mg by mouth every 24 hours as needed.      Ascorbic Acid (VITAMIN C) 1000 MG Tab Take 1 Tablet by mouth every day.      Multiple Vitamins-Minerals (ZINC PO) Take 1 Tablet by mouth every day.      Cholecalciferol (VITAMIN D3 PO) Take 1 Capsule by mouth every day.      therapeutic multivitamin-minerals (THERAGRAN-M) Tab Take 1 Tab by mouth every day.      aspirin EC (ECOTRIN) 81 MG Tablet Delayed Response Take 81 mg by mouth every day.       No current facility-administered medications for this visit.

## 2025-06-25 ENCOUNTER — SLEEP STUDY (OUTPATIENT)
Dept: SLEEP MEDICINE | Facility: MEDICAL CENTER | Age: 60
End: 2025-06-25
Attending: NURSE PRACTITIONER
Payer: COMMERCIAL

## 2025-06-25 DIAGNOSIS — G47.33 OSA (OBSTRUCTIVE SLEEP APNEA): Primary | ICD-10-CM

## 2025-06-25 PROCEDURE — 95811 POLYSOM 6/>YRS CPAP 4/> PARM: CPT | Mod: 26 | Performed by: STUDENT IN AN ORGANIZED HEALTH CARE EDUCATION/TRAINING PROGRAM

## 2025-06-26 NOTE — PROCEDURES
Patient: YAKELIN FIORE  ID: [id number] Date: 6/25/2025 Exam No.:   MONTAGE: Standard  STUDY TYPE: Treatment  RECORDING TECHNIQUE:   After the scalp was prepared, gold plated electrodes were applied to the scalp according to the International 10-20 System. EEG (electroencephalogram) was continuously monitored from the O1-M2, O2-M1, C3-M2, C4-M1, F3-M2, and F4-M1. EOGs (electrooculograms) were monitored by electrodes placed at the left and right outer canthi. Chin EMG (electromyogram) was monitored by electrodes placed on the mentalis and sub-mentalis muscles. Nasal and oral airflow were monitored using a triple port thermocouple as well as oronasal pressure transducer. Respiratory effort was measured by inductive plethysmography technology employing abdominal and thoracic belts. Blood oxygen saturation and pulse were monitored by pulse oximetry. Heart rhythm was monitored by surface electrocardiogram. Leg EMG was studied using surface electrodes placed on left and right anterior tibialis. A microphone was used to monitor tracheal sounds and snoring. Body position was monitored and documented by technician observation.   SCORING CRITERIA:   A modification of the AASM manual for scoring of sleep and associated events was used. Obstructive apneas were scored by cessation of airflow for at least 10 seconds with continuing respiratory effort. Central apneas were scored by cessation of airflow for at least 10 seconds with no respiratory effort. Hypopneas were scored by a 30% or more reduction in airflow for at least 10 seconds accompanied by arterial oxygen desaturation of 3% or an arousal. For CMS (Medicare) patients, per AASM rule 1B, hypopneas are scored by 30% with mild reduction in airflow for at least 10 seconds accompanied by arterial saturation decreased at 4%.  Study start time was 08:24:29 PM. Diagnostic recording time was 9h 3.5m with a total sleep time of 5h 39.5m resulting in a sleep efficiency of 62.47%%. Sleep  latency from the start of the study was 09 minutes and the latency from sleep to REM was 93 minutes. In total,159 arousals were scored for an arousal index of 28.1.  Respiratory:  There were a total of 124 apneas consisting of 107 obstructive apneas, 0 mixed apneas, and 17 central apneas. A total of 45 hypopneas were scored. The apnea index was 21.91 per hour and the hypopnea index was 7.95 per hour resulting in an overall AHI of 29.87. AHI during REM was 42.8 and AHI while supine was 29.87.  Oximetry:  There was a mean oxygen saturation of 94.0%. The minimum oxygen saturation in NREM was 77.0 % and in REM was 66.0%. The patient spent 35.7 minutes of TST with SaO2 <88%.  Cardiac:  The highest heart rate seen while awake was 82 BPM while the highest heart rate during sleep was 88 BPM with an average sleeping heart rate of 55 BPM.  Limb Movements:  There were a total of 8 PLMs during sleep which resulted in a PLMS index of 1.4. Of these, 7 were associated with arousals which resulted in a PLMS arousal index of 1.2.  Titration:   BiPAP was tried from 12/8 to 25/21  This was a fully attended sleep study. This test was technically adequate. This patient was titrated on CPAP starting at *** cm of water pressure. Patient was titrated up to *** cm of water pressure. Patient did best at *** cm of water pressure. Patient spent *** minutes at that pressure and the AHI was *** which is considered *** obstructive sleep apnea.   Assessment:   ***Obstructive Sleep Apnea Hypopnea - AHI*** ***Nocturnal desaturation - minh saturation ***% - saturations <88% below for *** minutes of TST.  Recommendation:    prove most effective in decreasing respiratory events and resolving hypoxemia  Multiple masks were tried due to difficulty with mask leak. Large Airfit F30i with chinstrap proved to be best model  Recommendation:   Continue on autoBIPAP, recommend pressure settings 25/19 with PS: 4cmH2O with heated tubing and proper mask fit (mask fit will be imperative given high pressure settings)  After multiple mask trials, patient ended up on large AirFit F30i with chinstrap which greatly improved mask leak however was still present during REM sleep  I also recommend 30 day compliance download to assess the efficacy to the recommended pressure, measure leak, apnea hypopnea index and compliance for further outpatient monitoring and management of PAP therapy. In some cases alternative treatment options may be proven effective in resolving sleep apnea. These options include upper airway surgery, the use of a dental orthotic, weight loss, or positional therapy. Clinical correlation is required. In general patients with sleep apnea are advised to avoid alcohol, sedatives and not to operate a motor vehicle while drowsy. Untreated sleep apnea increases the risk for cardiovascular and neurovascular disease.

## 2025-06-30 DIAGNOSIS — Z00.00 WELL ADULT EXAM: ICD-10-CM

## 2025-06-30 RX ORDER — AMLODIPINE BESYLATE 5 MG/1
TABLET ORAL
Qty: 90 TABLET | Refills: 3 | Status: SHIPPED | OUTPATIENT
Start: 2025-06-30

## 2025-06-30 NOTE — TELEPHONE ENCOUNTER
Received request via: Pharmacy    Was the patient seen in the last year in this department? Yes    Does the patient have an active prescription (recently filled or refills available) for medication(s) requested? No    Pharmacy Name:     [facility] - [address] [address]  Phone: [phone] Fax: [phone]        Does the patient have custodial Plus and need 100-day supply? (This applies to ALL medications) Patient does not have SCP

## 2025-07-03 ENCOUNTER — OFFICE VISIT (OUTPATIENT)
Dept: SLEEP MEDICINE | Facility: MEDICAL CENTER | Age: 60
End: 2025-07-03
Attending: NURSE PRACTITIONER
Payer: COMMERCIAL

## 2025-07-03 VITALS
HEIGHT: 70 IN | WEIGHT: 165 LBS | OXYGEN SATURATION: 95 % | DIASTOLIC BLOOD PRESSURE: 58 MMHG | BODY MASS INDEX: 23.62 KG/M2 | HEART RATE: 78 BPM | SYSTOLIC BLOOD PRESSURE: 110 MMHG | RESPIRATION RATE: 18 BRPM

## 2025-07-03 DIAGNOSIS — G47.33 OSA (OBSTRUCTIVE SLEEP APNEA): Primary | ICD-10-CM

## 2025-07-03 PROCEDURE — 99214 OFFICE O/P EST MOD 30 MIN: CPT | Performed by: NURSE PRACTITIONER

## 2025-07-03 PROCEDURE — 3074F SYST BP LT 130 MM HG: CPT | Performed by: NURSE PRACTITIONER

## 2025-07-03 PROCEDURE — 3078F DIAST BP <80 MM HG: CPT | Performed by: NURSE PRACTITIONER

## 2025-07-03 ASSESSMENT — FIBROSIS 4 INDEX: FIB4 SCORE: 0.7

## 2025-07-07 DIAGNOSIS — E78.5 DYSLIPIDEMIA: Chronic | ICD-10-CM

## 2025-07-07 DIAGNOSIS — M1A.09X0 IDIOPATHIC CHRONIC GOUT OF MULTIPLE SITES WITHOUT TOPHUS: ICD-10-CM

## 2025-07-07 DIAGNOSIS — Z00.00 WELL ADULT EXAM: ICD-10-CM

## 2025-07-07 RX ORDER — ALLOPURINOL 300 MG/1
300 TABLET ORAL DAILY
Qty: 90 TABLET | Refills: 3 | Status: SHIPPED | OUTPATIENT
Start: 2025-07-07

## 2025-07-07 RX ORDER — ROSUVASTATIN CALCIUM 5 MG/1
5 TABLET, COATED ORAL EVERY EVENING
Qty: 100 TABLET | Refills: 3 | Status: SHIPPED | OUTPATIENT
Start: 2025-07-07

## 2025-07-07 NOTE — PROGRESS NOTES
Chief Complaint   Patient presents with    Follow-Up     SLEEP - ESTABLISHED PT CHART PREP COMPLETED ON 05/28/2025    Setup date: 10/11/2024    Last Office Visit 04/14/2025 with Umberto Wilson    1st Compliance: No    DME: [facility]    PAP/O2/OAT: auto BiPAP 16/8/5 cm/H2O  Wireless Data? YES [facility]         SLEEP - RESULTS - CHART PREP COMPLETED ON 06/23/2025    Last Office Visit 06/04/2025 with Umberto Wilson    Study Complete on 06/25/2025       HPI:  Fuad Hwang is a 60 y.o. year old male here today for follow-up on sleep study results.  Last seen by me on 6/4/2025. He was diagnosed with obstructive sleep apnea around 2014/2015 via in lab sleep study in [place]. Following diagnosis he was placed on a CPAP machine      Currently using a hybrid fullface mask. Does have occasional dry mouth, but denies any difficulty with mask leak or pressure intolerance. Gets between 8 to 9 hours average of sleep, but 4 hours on a bad night due to busy work schedule. Currently on auto BiPAP settings. Does not yet noted improvement in sleep quality. Denies excessive daytime sleepiness or morning headaches or palpitations. Previous compliance showed AHI of 30.5. Based on titration study, I did change to spontaneous BiPAP with 14/8 cm/H2O. last visit still elevated apnea hypopnea score, and was changed back to auto BiPAP 16/8/5 cm/H2O.    Patient states that he continues to struggle with mask fit. 30-day compliance shows 67% use with an average time of, currently on auto H2O with residual.  Evidence of mask obstructive apnea and also obstructive apnea index of 19.2.    PSG titration (6/25/2025):  Moderate obstructive Sleep Apnea Hypopnea - AHI 29.87 with nocturnal desaturation - minh saturation 66% - saturations <88% below for 35.7 minutes of TST.  Impression:  All sleep stages seen  Patient had fragmented sleep resulting in decreased sleep efficiency  Supine REM seen on BiPAP  Respiratory events were very difficult to control  "during REM sleep  Patient had residual events in REM despite high pressure settings of BiPAP  Higher pressure settings specifically BiPAP 25/21 cmH2O did prove most effective in decreasing respiratory events and resolving hypoxemia  Multiple masks were tried due to difficulty with mask leak. Large Airfit F30i with chinstrap proved to be best model  Recommendation:   Continue on autoBIPAP, recommend pressure settings 25/19 with PS: 4cmH2O with heated tubing and proper mask fit (mask fit will be imperative given high pressure settings)  After multiple mask trials, patient ended up on large AirFit F30i with chinstrap which greatly improved mask leak however was still present during REM sleep    ROS: As per HPI and otherwise negative if not stated.    Past Medical History[1]    Past Surgical History[2]    Family History   Problem Relation Age of Onset    Heart Disease Mother     Diabetes Mother     Stroke Mother     No Known Problems Father     Cancer Maternal Grandmother         Lung, smoking    Heart Disease Maternal Grandmother     Diabetes Maternal Grandmother     Cancer Maternal Grandfather     No Known Problems Paternal Grandmother     Cancer Paternal Grandfather        Allergies as of 07/03/2025    (No Known Allergies)        Vitals:  /58 (BP Location: Left arm, Patient Position: Sitting, BP Cuff Size: Adult)   Pulse 78   Resp 18   Ht 1.778 m (5' 10\")   Wt 74.8 kg (165 lb)   SpO2 95%     Current medications as of today Current Medications[3]      Physical Exam:   Gen:           Alert and oriented, No apparent distress. Mood and affect appropriate, normal interaction with examiner.  Eyes:          PERRL, EOM intact, sclere white, conjunctive moist.  Ears:          Not examined.   Hearing:     Grossly intact.  Nose:          Normal, no lesions or deformities.  Dentition:    Good dentition.  Oropharynx:   Tongue normal, posterior pharynx without erythema or exudate.  Neck:        Supple, trachea midline, " no masses.  Respiratory Effort: No intercostal retractions or use of accessory muscles.   Lung Auscultation:      Clear to auscultation bilaterally; no rales, rhonchi or wheezing.  CV:            Regular rate and rhythm. No murmurs, rubs or gallops.  Abd:           Not examined.   Lymphadenopathy: Not examined.  Gait and Station: Normal.  Digits and Nails: No clubbing, cyanosis, petechiae, or nodes.   Cranial Nerves: II-XII grossly intact.  Skin:        No rashes, lesions or ulcers noted.               Ext:           No cyanosis or edema.      Assessment:  1. CLAIRE (obstructive sleep apnea)  DME Other        Plan:  Adjustment made to auto BiPAP with settings from titration study.  Order also placed for updated mask F 30I with chinstrap.  Patient to follow-up in approximately 8 weeks for compliance reassessment.    Please note that this dictation was created using voice recognition software. I have made every reasonable attempt to correct obvious errors, but it is possible there are errors of grammar and possibly content that I did not discover before finalizing the note.         [1]   Past Medical History:  Diagnosis Date    Apnea, sleep     Does not CPAP or BIPAP    CAD (coronary artery disease)     Cancer (HCC) 10/2022    Prostrate removed 2/2023    Daytime sleepiness     Family history of adverse effect to anesthesia     Mom had intraoperative awareness.    Hyperlipidemia     Hypertension     Snoring    [2]   Past Surgical History:  Procedure Laterality Date    APPENDECTOMY      MYRINGOTOMY      OTHER  ?, 1996    Cyst removed right neck childhood, Cyst removed right hand    PROSTATECTOMY, RADIAL      Prostate removed 2023    SHOULDER ARTHROSCOPY Left    [3]   Current Outpatient Medications   Medication Sig Dispense Refill    amLODIPine (NORVASC) 5 MG Tab TAKE 1 TABLET BY MOUTH EVERY DAY. 90 Tablet 3    rosuvastatin (CRESTOR) 5 MG Tab Take 1 Tablet by mouth every evening. 100 Tablet 3    valacyclovir (VALTREX) 1 GM  Tab Take 1 Tablet by mouth every day. 5 Tablet 5    vitamin D3 (CHOLECALCIFEROL) 1000 Unit (25 mcg) Tab Take 1,000 Units by mouth every day.      allopurinol (ZYLOPRIM) 300 MG Tab Take 1 Tablet by mouth every day. 90 Tablet 0    sildenafil citrate (VIAGRA) 100 MG tablet Take 100 mg by mouth every 24 hours as needed.      Ascorbic Acid (VITAMIN C) 1000 MG Tab Take 1 Tablet by mouth every day.      Multiple Vitamins-Minerals (ZINC PO) Take 1 Tablet by mouth every day.      Cholecalciferol (VITAMIN D3 PO) Take 1 Capsule by mouth every day.      therapeutic multivitamin-minerals (THERAGRAN-M) Tab Take 1 Tab by mouth every day.      aspirin EC (ECOTRIN) 81 MG Tablet Delayed Response Take 81 mg by mouth every day.       No current facility-administered medications for this visit.

## 2025-07-07 NOTE — TELEPHONE ENCOUNTER
Received request via: Pharmacy    Was the patient seen in the last year in this department? Yes    Does the patient have an active prescription (recently filled or refills available) for medication(s) requested? No    Pharmacy Name:   [facility] - [address] [address]  Phone: [phone] Fax: [phone]        Does the patient have USP Plus and need 100-day supply? (This applies to ALL medications) Patient does not have SCP